# Patient Record
Sex: MALE | Race: WHITE | Employment: FULL TIME | URBAN - METROPOLITAN AREA
[De-identification: names, ages, dates, MRNs, and addresses within clinical notes are randomized per-mention and may not be internally consistent; named-entity substitution may affect disease eponyms.]

---

## 2017-03-10 ENCOUNTER — EMPLOYEE WELLNESS (OUTPATIENT)
Dept: OTHER | Age: 37
End: 2017-03-10

## 2017-05-31 ENCOUNTER — OFFICE VISIT (OUTPATIENT)
Dept: FAMILY MEDICINE CLINIC | Age: 37
End: 2017-05-31

## 2017-05-31 VITALS
HEART RATE: 80 BPM | SYSTOLIC BLOOD PRESSURE: 110 MMHG | WEIGHT: 193.8 LBS | HEIGHT: 70 IN | OXYGEN SATURATION: 98 % | DIASTOLIC BLOOD PRESSURE: 60 MMHG | BODY MASS INDEX: 27.75 KG/M2

## 2017-05-31 DIAGNOSIS — K42.9 UMBILICAL HERNIA WITHOUT OBSTRUCTION AND WITHOUT GANGRENE: Primary | ICD-10-CM

## 2017-05-31 PROCEDURE — 99213 OFFICE O/P EST LOW 20 MIN: CPT | Performed by: FAMILY MEDICINE

## 2017-05-31 ASSESSMENT — ENCOUNTER SYMPTOMS
CONSTIPATION: 0
DIARRHEA: 0
ABDOMINAL PAIN: 1
ANAL BLEEDING: 0
NAUSEA: 0
VOMITING: 0

## 2017-10-30 ENCOUNTER — OFFICE VISIT (OUTPATIENT)
Dept: SURGERY | Age: 37
End: 2017-10-30

## 2017-10-30 ENCOUNTER — SURG/PROC ORDERS (OUTPATIENT)
Dept: SURGERY | Age: 37
End: 2017-10-30

## 2017-10-30 VITALS
DIASTOLIC BLOOD PRESSURE: 70 MMHG | BODY MASS INDEX: 30.45 KG/M2 | WEIGHT: 194 LBS | HEIGHT: 67 IN | SYSTOLIC BLOOD PRESSURE: 124 MMHG

## 2017-10-30 DIAGNOSIS — K42.9 UMBILICAL HERNIA WITHOUT OBSTRUCTION AND WITHOUT GANGRENE: Primary | ICD-10-CM

## 2017-10-30 PROCEDURE — 99242 OFF/OP CONSLTJ NEW/EST SF 20: CPT | Performed by: SURGERY

## 2017-10-30 RX ORDER — SODIUM CHLORIDE 0.9 % (FLUSH) 0.9 %
10 SYRINGE (ML) INJECTION PRN
Status: CANCELLED | OUTPATIENT
Start: 2017-10-30

## 2017-10-30 RX ORDER — SODIUM CHLORIDE 0.9 % (FLUSH) 0.9 %
10 SYRINGE (ML) INJECTION EVERY 12 HOURS SCHEDULED
Status: CANCELLED | OUTPATIENT
Start: 2017-10-30

## 2017-11-03 ENCOUNTER — OFFICE VISIT (OUTPATIENT)
Dept: FAMILY MEDICINE CLINIC | Age: 37
End: 2017-11-03

## 2017-11-03 VITALS
HEART RATE: 92 BPM | TEMPERATURE: 98 F | HEIGHT: 70 IN | WEIGHT: 186 LBS | DIASTOLIC BLOOD PRESSURE: 70 MMHG | SYSTOLIC BLOOD PRESSURE: 114 MMHG | RESPIRATION RATE: 18 BRPM | BODY MASS INDEX: 26.63 KG/M2

## 2017-11-03 DIAGNOSIS — Z00.00 PE (PHYSICAL EXAM), ANNUAL: Primary | ICD-10-CM

## 2017-11-03 DIAGNOSIS — D22.9 ATYPICAL NEVI: ICD-10-CM

## 2017-11-03 DIAGNOSIS — K42.9 UMBILICAL HERNIA WITHOUT OBSTRUCTION AND WITHOUT GANGRENE: ICD-10-CM

## 2017-11-03 PROCEDURE — 99395 PREV VISIT EST AGE 18-39: CPT | Performed by: INTERNAL MEDICINE

## 2017-11-03 ASSESSMENT — ENCOUNTER SYMPTOMS
VOMITING: 0
EYE PAIN: 0
CONSTIPATION: 0
WHEEZING: 0
SHORTNESS OF BREATH: 0
COLOR CHANGE: 0
NAUSEA: 0
DIARRHEA: 0

## 2017-11-03 ASSESSMENT — PATIENT HEALTH QUESTIONNAIRE - PHQ9
SUM OF ALL RESPONSES TO PHQ9 QUESTIONS 1 & 2: 0
2. FEELING DOWN, DEPRESSED OR HOPELESS: 0
SUM OF ALL RESPONSES TO PHQ QUESTIONS 1-9: 0
1. LITTLE INTEREST OR PLEASURE IN DOING THINGS: 0

## 2017-11-03 NOTE — PROGRESS NOTES
11/3/2017    This is a 30 y.o. male   Chief Complaint   Patient presents with   [facility] Emp estLiam care   . establish  New pcp  Works here  In microbiology  Has umbilical hernia and getting surgery next Friday  No heart or lung problems  Was not told he needed a preop  Last yr had two cortisone shots in the lumbar area  Back pain is much better    2 kids   No smoking  Occasional beer  No sleep apnea      HPI    Review of Systems   Constitutional: Negative for appetite change, fatigue and unexpected weight change. HENT: Negative for hearing loss and tinnitus. Eyes: Negative for pain and visual disturbance. Respiratory: Negative for shortness of breath and wheezing. Cardiovascular: Negative for chest pain, palpitations and leg swelling. Gastrointestinal: Negative for constipation, diarrhea, nausea and vomiting. Endocrine: Negative for cold intolerance and heat intolerance. Genitourinary: Negative for dysuria and frequency. Musculoskeletal: Negative for gait problem and joint swelling. Skin: Negative for color change and rash. Neurological: Negative for dizziness and headaches. Psychiatric/Behavioral: Negative for dysphoric mood. The patient is not nervous/anxious. No past medical history on file.     Prior to Visit Medications    Not on File       Past Surgical History:   Procedure Laterality Date    MENISCECTOMY      right knee       Social History     Social History    Marital status:      Spouse name: Jessee Ocampo Number of children: 2    Years of education: N/A     Occupational History    microbiology tech      Social History Main Topics    Smoking status: Never Smoker    Smokeless tobacco: Never Used    Alcohol use 1.2 oz/week     2 Cans of beer per week    Drug use: No    Sexual activity: Not on file     Other Topics Concern    Not on file     Social History Narrative    Exercises regularly, eats healthy diet       Family History   Problem Relation annual    Atypical nevi    Umbilical hernia without obstruction and without gangrene      Luzma Zamora was here as a new patient to get established. He is doing very well. He is having umbilical hernia surgery next week. He did not indicate he needed a preop but I told him this could count as a preop exam . He is stable for surgery. He does have a lot of atypical nevi and I am encouraging him to go see a dermatologist and gave him a referral to the [facility] dermatology group. If he has difficulty getting in with that group in the next few months. Then  he needs to call me back. He does not need lab work or immunizations today.   I reviewed his cholesterol and glucose    fu one year

## 2017-11-08 NOTE — PRE-PROCEDURE INSTRUCTIONS
PRE OP INSTRUCTION SHEET   1. Do not eat or drink anything after 12 midnight  prior to surgery. This includes no water, chewing gum or mints. 2. Take the following pills will a small sip of water (see MAR)                                        3. Aspirin, Ibuprofen, Advil, Naproxen, Vitamin E, fish oil and other Anti-inflammatory products should be stopped for 5 days before surgery or as directed by your physician. 4. Check with your Doctor regarding stopping Plavix, Coumadin, Lovenox, Fragmin or other blood thinners   5. Do not smoke, and do not drink any alcoholic beverages 24 hours prior to surgery. This includes NA Beer. 6. You may brush your teeth and gargle the morning of surgery. DO NOT SWALLOW WATER   7. You MUST make arrangements for a responsible adult to take you home after your surgery. You will not be allowed to leave alone or drive yourself home. It is strongly suggested someone stay with you the first 24 hrs. Your surgery will be cancelled if you do not have a ride home. 8. A parent/legal guardian must accompany a child scheduled for surgery and plan to stay at the hospital until the child is discharged. Please do not bring other children with you. 9. Please wear simple, loose fitting clothing to the hospital.  [facility] Staff not bring valuables (money, credit cards, checkbooks, etc.) Do not wear any makeup (including no eye makeup) or nail polish on your fingers or toes. 10. DO NOT wear any jewelry or piercings on day of surgery. All body piercing jewelry must be removed. 11. If you have dentures,glasses, or contacts they will be removed before going to the OR; we will provide you a container. 12. Please see your family doctor/and cardiologist for a history & physical and/or concerning medications. Bring any test results/reports from your physician's office. Have history and labs faxed to [phone].  Remember to bring Blood Bank bracelet on the day of surgery. 14. If you have a Living Will and Durable Power of  for Healthcare, please bring in a copy. 13. Notify your Surgeon if you develop any illness between now and surgery  time, cough, cold, fever, sore throat, nausea, vomiting, etc.  Please notify your surgeon if you experience dizziness, shortness of breath or blurred vision between now & the time of your surgery   16. DO NOT shave your operative site 96 hours prior to surgery. For face & neck surgery, men may use an electric razor 48 hours prior to surgery. 17. Shower with _x__Antibacterial soap (x_chlorhexidine for total joint  Pt's) shower two times before surgery.(the morning of and the night before. 18. To provide excellent care visitors will be limited to one in the room at any given time.   Please call pre admission testing if you any further questions [phone] or [phone]

## 2017-11-10 ENCOUNTER — HOSPITAL ENCOUNTER (OUTPATIENT)
Dept: SURGERY | Age: 37
Discharge: HOME | End: 2017-11-10
Attending: SURGERY | Admitting: SURGERY

## 2017-11-10 VITALS
TEMPERATURE: 97.8 F | HEIGHT: 71 IN | SYSTOLIC BLOOD PRESSURE: 118 MMHG | WEIGHT: 195 LBS | OXYGEN SATURATION: 100 % | DIASTOLIC BLOOD PRESSURE: 67 MMHG | HEART RATE: 61 BPM | BODY MASS INDEX: 27.3 KG/M2 | RESPIRATION RATE: 16 BRPM

## 2017-11-10 PROCEDURE — 49585 REPAIR UMBILICAL HERN,5+Y/O,REDUC: CPT | Performed by: SURGERY

## 2017-11-10 RX ORDER — SODIUM CHLORIDE 0.9 % (FLUSH) 0.9 %
10 SYRINGE (ML) INJECTION PRN
Status: DISCONTINUED | OUTPATIENT
Start: 2017-11-10 | End: 2017-11-10 | Stop reason: SDUPTHER

## 2017-11-10 RX ORDER — SODIUM CHLORIDE 0.9 % (FLUSH) 0.9 %
10 SYRINGE (ML) INJECTION EVERY 12 HOURS SCHEDULED
Status: DISCONTINUED | OUTPATIENT
Start: 2017-11-10 | End: 2017-11-10 | Stop reason: SDUPTHER

## 2017-11-10 RX ORDER — SODIUM CHLORIDE 0.9 % (FLUSH) 0.9 %
10 SYRINGE (ML) INJECTION EVERY 12 HOURS SCHEDULED
Status: DISCONTINUED | OUTPATIENT
Start: 2017-11-10 | End: 2017-11-11 | Stop reason: HOSPADM

## 2017-11-10 RX ORDER — SODIUM CHLORIDE, SODIUM LACTATE, POTASSIUM CHLORIDE, CALCIUM CHLORIDE 600; 310; 30; 20 MG/100ML; MG/100ML; MG/100ML; MG/100ML
INJECTION, SOLUTION INTRAVENOUS CONTINUOUS
Status: DISCONTINUED | OUTPATIENT
Start: 2017-11-10 | End: 2017-11-11 | Stop reason: HOSPADM

## 2017-11-10 RX ORDER — MEPERIDINE HYDROCHLORIDE 25 MG/ML
12.5 INJECTION INTRAMUSCULAR; INTRAVENOUS; SUBCUTANEOUS EVERY 5 MIN PRN
Status: DISCONTINUED | OUTPATIENT
Start: 2017-11-10 | End: 2017-11-11 | Stop reason: HOSPADM

## 2017-11-10 RX ORDER — OXYCODONE HYDROCHLORIDE 5 MG/1
5-10 TABLET ORAL EVERY 4 HOURS PRN
Qty: 28 TABLET | Refills: 0 | Status: SHIPPED | OUTPATIENT
Start: 2017-11-10 | End: 2017-11-17

## 2017-11-10 RX ORDER — LABETALOL HYDROCHLORIDE 5 MG/ML
5 INJECTION, SOLUTION INTRAVENOUS EVERY 10 MIN PRN
Status: DISCONTINUED | OUTPATIENT
Start: 2017-11-10 | End: 2017-11-11 | Stop reason: HOSPADM

## 2017-11-10 RX ORDER — OXYCODONE HYDROCHLORIDE AND ACETAMINOPHEN 5; 325 MG/1; MG/1
1 TABLET ORAL PRN
Status: COMPLETED | OUTPATIENT
Start: 2017-11-10 | End: 2017-11-10

## 2017-11-10 RX ORDER — SODIUM CHLORIDE 0.9 % (FLUSH) 0.9 %
10 SYRINGE (ML) INJECTION PRN
Status: DISCONTINUED | OUTPATIENT
Start: 2017-11-10 | End: 2017-11-11 | Stop reason: HOSPADM

## 2017-11-10 RX ORDER — HYDRALAZINE HYDROCHLORIDE 20 MG/ML
5 INJECTION INTRAMUSCULAR; INTRAVENOUS EVERY 10 MIN PRN
Status: DISCONTINUED | OUTPATIENT
Start: 2017-11-10 | End: 2017-11-11 | Stop reason: HOSPADM

## 2017-11-10 RX ORDER — LIDOCAINE HYDROCHLORIDE 10 MG/ML
0.3 INJECTION, SOLUTION EPIDURAL; INFILTRATION; INTRACAUDAL; PERINEURAL
Status: ACTIVE | OUTPATIENT
Start: 2017-11-10 | End: 2017-11-10

## 2017-11-10 RX ORDER — ONDANSETRON 2 MG/ML
4 INJECTION INTRAMUSCULAR; INTRAVENOUS EVERY 10 MIN PRN
Status: DISCONTINUED | OUTPATIENT
Start: 2017-11-10 | End: 2017-11-11 | Stop reason: HOSPADM

## 2017-11-10 RX ORDER — OXYCODONE HYDROCHLORIDE AND ACETAMINOPHEN 5; 325 MG/1; MG/1
2 TABLET ORAL PRN
Status: COMPLETED | OUTPATIENT
Start: 2017-11-10 | End: 2017-11-10

## 2017-11-10 RX ADMIN — OXYCODONE HYDROCHLORIDE AND ACETAMINOPHEN 1 TABLET: 5; 325 TABLET ORAL at 10:55

## 2017-11-10 RX ADMIN — Medication 0.5 MG: at 10:31

## 2017-11-10 ASSESSMENT — PAIN SCALES - GENERAL
PAINLEVEL_OUTOF10: 0
PAINLEVEL_OUTOF10: 0
PAINLEVEL_OUTOF10: 2
PAINLEVEL_OUTOF10: 7
PAINLEVEL_OUTOF10: 2
PAINLEVEL_OUTOF10: 2
PAINLEVEL_OUTOF10: 0
PAINLEVEL_OUTOF10: 2
PAINLEVEL_OUTOF10: 2
PAINLEVEL_OUTOF10: 5

## 2017-11-10 ASSESSMENT — PAIN DESCRIPTION - PAIN TYPE: TYPE: SURGICAL PAIN

## 2017-11-10 ASSESSMENT — PAIN DESCRIPTION - LOCATION: LOCATION: ABDOMEN

## 2017-11-10 ASSESSMENT — PAIN DESCRIPTION - DESCRIPTORS: DESCRIPTORS: ACHING

## 2017-11-10 ASSESSMENT — PAIN - FUNCTIONAL ASSESSMENT: PAIN_FUNCTIONAL_ASSESSMENT: 0-10

## 2017-11-10 NOTE — PROGRESS NOTES
.Attempted to contact patient. No answer. Unable to leave message related to mailbox full, No message left at 2:53 PM on 11/7/2017.
ASSESSMENT UNCHANGED. FAMILY VERBALIZED UNDERSTANDING OF DISCHARGE INSTRUCTION. DC'D VIA WC IN STABLE CONDITION.
Attempted to contact patient. No answer. Message left at 9:17 AM on 11/8/2017.
and Phase II process. 23.  Patient pain level is established preoperatively using age appropriate pain scale. 24.  The patient will move to fall risk upon sedation- during and through the recovery phase. Interventions- orient the patient to the environment, especially the location of the bathroom; provide treaded socks/non-skid footwear; demonstrate and teach back use of the nurse's call system; instruct the patient to call for help before getting out of bed; lock all movable equipment before transferring patient; keep bed in lowest position possible.  25.  Other:
surgery. 14. If you have a Living Will and Durable Power of  for Healthcare, please bring in a copy. 13. Notify your Surgeon if you develop any illness between now and surgery  time, cough, cold, fever, sore throat, nausea, vomiting, etc.  Please notify your surgeon if you experience dizziness, shortness of breath or blurred vision between now & the time of your surgery   16. DO NOT shave your operative site 96 hours prior to surgery. For face & neck surgery, men may use an electric razor 48 hours prior to surgery. 17. Shower with _x__Antibacterial soap (x_chlorhexidine for total joint  Pt's) shower two times before surgery.(the morning of and the night before. 18. To provide excellent care visitors will be limited to one in the room at any given time.   Please call pre admission testing if you any further questions [phone] or [phone]

## 2017-11-10 NOTE — H&P
I have reviewed the progress note serving as history and physical dated October/30/ 2017 and examined the patient and find no relevant changes. I have reviewed with the patient and/or family the risks, benefits, and alternatives to the procedure.

## 2017-11-10 NOTE — PLAN OF CARE
and Phase II process. 23.  Patient pain level is established preoperatively using age appropriate pain scale. 24.  The patient will move to fall risk upon sedation- during and through the recovery phase. Interventions- orient the patient to the environment, especially the location of the bathroom; provide treaded socks/non-skid footwear; demonstrate and teach back use of the nurse's call system; instruct the patient to call for help before getting out of bed; lock all movable equipment before transferring patient; keep bed in lowest position possible.  25.  Other:

## 2017-11-10 NOTE — ANESTHESIA POST-OP
Postoperative Anesthesia Note    Name:    Sawyer Wu  MRN:      [id number]    Patient Vitals for the past 12 hrs:   BP Temp Temp src Pulse Resp SpO2 Height Weight   11/10/17 1147 118/67 - - 61 16 - - -   11/10/17 1120 (!) 114/54 97.8 °F (36.6 °C) Temporal 64 17 100 % - -   11/10/17 1115 111/72 - - 60 13 100 % - -   11/10/17 1100 114/66 97.8 °F (36.6 °C) Temporal 73 16 100 % - -   11/10/17 1045 107/66 - - 69 14 100 % - -   11/10/17 1028 111/69 - - 77 16 100 % - -   11/10/17 1023 109/68 - - 77 12 99 % - -   11/10/17 1018 115/68 97.5 °F (36.4 °C) Temporal 80 14 100 % - -   11/10/17 0920 - - - - - - 5' 11\" (1.803 m) 195 lb (88.5 kg)   11/10/17 0912 118/75 98.6 °F (37 °C) Temporal 74 16 98 % - -        LABS:    CBC  Lab Results   Component Value Date/Time    WBC 7.4 04/12/2011 09:26 AM    HGB 15.1 04/12/2011 09:26 AM    HCT 45.0 04/12/2011 09:26 AM     04/12/2011 09:26 AM     RENAL  Lab Results   Component Value Date/Time     04/12/2011 09:26 AM    K 4.1 04/12/2011 09:26 AM     04/12/2011 09:26 AM    CO2 32 04/12/2011 09:26 AM    BUN 16 04/12/2011 09:26 AM    CREATININE 1.0 04/12/2011 09:26 AM    GLUCOSE 89 03/10/2017 09:41 AM     COAGS  No results found for: PROTIME, INR, APTT    Intake & Output: In: 8188 [P.O.:240; I.V.:850]  Out: -     Nausea & Vomiting:  No    Level of Consciousness:  Awake    Pain Assessment:  Adequate analgesia    Anesthesia Complications:  No apparent anesthetic complications    SUMMARY      Vital signs stable  OK to discharge from Stage I post anesthesia care.   Care transferred from Anesthesiology department on discharge from perioperative area

## 2017-11-11 NOTE — OP NOTE
[address]                  [address]                                 OPERATIVE REPORT    PATIENT NAME: Lori Grey                    :        1980  MED REC NO:   [id number]                          ROOM:  ACCOUNT NO:   [de-identified]                          ADMIT DATE: 11/10/2017  PROVIDER:     Lucy Villa MD    DATE OF PROCEDURE:  11/10/2017    PREOPERATIVE DIAGNOSIS:  Umbilical hernia. POSTOPERATIVE DIAGNOSIS:  Umbilical hernia. PROCEDURE:  Umbilical hernia repair with mesh. ANESTHESIA:  Local with MAC. SURGEON:  Lucy Villa MD    INDICATIONS: The patient is a 29-year-old gentleman, who presented with  abdominal pain and was found to have an umbilical hernia. OPERATIVE SUMMARY:  After preoperative evaluation, the patient was brought  into the operating suite and placed in a comfortable supine position on the  operating room table. Monitoring equipment was attached and he was given  intravenous sedation per anesthesia. His abdomen was sterilely prepped and  draped, and the periumbilical area was anesthetized with local anesthetic. A semicircular infraumbilical incision was made. This was dissected down  to the fascia. The umbilicus was dissected off the underlying hernia  defect and the hernia was reduced. The preperitoneal space was entered and  enlarged, and a small Ventralight ST mesh is obtained and placed in the  preperitoneal space. It was pulled up snugly against the anterior  abdominal wall. The fascia was then closed primarily, incorporating tail  of the mesh in the closure. The tail of the mesh was then cut off at the  fascial level and the base of the umbilicus was tacked back down to the  fascia with a figure-of-eight suture of 0 Vicryl. Subcutaneous tissues  were approximated with interrupted sutures of 3-0 Vicryl and the skin was  closed with a running subcuticular suture of 4-0 Vicryl. Benzoin,  Steri-Strips and dry sterile dressings were applied. All sponge, needle  and instrument counts were correct at the end of the case. The patient  tolerated the procedure well and was taken to recovery area in stable  condition. Cristina Bledsoe MD    D: 11/10/2017 10:30:41       T: 11/10/2017 10:32:38     [email or web address]  Job#: [id number]     Doc#: [id number]    CC:  Lorelei Heller.  Shanique Neal MD

## 2017-11-27 ENCOUNTER — OFFICE VISIT (OUTPATIENT)
Dept: SURGERY | Age: 37
End: 2017-11-27

## 2017-11-27 VITALS
BODY MASS INDEX: 25.48 KG/M2 | HEIGHT: 71 IN | DIASTOLIC BLOOD PRESSURE: 74 MMHG | SYSTOLIC BLOOD PRESSURE: 120 MMHG | WEIGHT: 182 LBS

## 2017-11-27 DIAGNOSIS — Z98.890 POST-OPERATIVE STATE: Primary | ICD-10-CM

## 2017-11-27 PROCEDURE — 99024 POSTOP FOLLOW-UP VISIT: CPT | Performed by: SURGERY

## 2017-11-27 NOTE — PROGRESS NOTES
[facility] GENERAL SURGERY      S:   Patient presents s/p umbilical hernia repair with mesh. He reports doing well. O:   Comfortable         Incision site healing well. A:   S/P umbilical hernia repair with mesh    P:   Follow up as needed.

## 2018-03-12 ENCOUNTER — EMPLOYEE WELLNESS (OUTPATIENT)
Dept: OTHER | Age: 38
End: 2018-03-12

## 2018-03-12 LAB
CHOLESTEROL, TOTAL: 187 MG/DL (ref 0–199)
GLUCOSE BLD-MCNC: 96 MG/DL (ref 70–99)
HDLC SERPL-MCNC: 38 MG/DL (ref 40–60)
LDL CHOLESTEROL CALCULATED: 126 MG/DL
TRIGL SERPL-MCNC: 116 MG/DL (ref 0–150)

## 2018-03-20 VITALS — WEIGHT: 197 LBS | BODY MASS INDEX: 27.58 KG/M2

## 2018-04-02 VITALS — BODY MASS INDEX: 27.07 KG/M2 | WEIGHT: 194 LBS

## 2018-04-12 PROBLEM — Z00.00 PE (PHYSICAL EXAM), ANNUAL: Status: RESOLVED | Noted: 2017-11-03 | Resolved: 2018-04-12

## 2018-04-24 ENCOUNTER — OFFICE VISIT (OUTPATIENT)
Dept: DERMATOLOGY | Age: 38
End: 2018-04-24

## 2018-04-24 DIAGNOSIS — D48.9 NEOPLASM OF UNCERTAIN BEHAVIOR: Primary | ICD-10-CM

## 2018-04-24 DIAGNOSIS — L91.0 HYPERTROPHIC SCAR: ICD-10-CM

## 2018-04-24 DIAGNOSIS — D22.9 MULTIPLE BENIGN MELANOCYTIC NEVI: ICD-10-CM

## 2018-04-24 DIAGNOSIS — Z78.9 NON-TOBACCO USER: ICD-10-CM

## 2018-04-24 DIAGNOSIS — L91.8 SKIN TAGS, MULTIPLE ACQUIRED: ICD-10-CM

## 2018-04-24 DIAGNOSIS — L81.2 EPHELIDES: ICD-10-CM

## 2018-04-24 PROCEDURE — 99243 OFF/OP CNSLTJ NEW/EST LOW 30: CPT | Performed by: DERMATOLOGY

## 2018-04-24 PROCEDURE — 11100 PR BIOPSY OF SKIN LESION: CPT | Performed by: DERMATOLOGY

## 2018-04-27 ENCOUNTER — TELEPHONE (OUTPATIENT)
Dept: DERMATOLOGY | Age: 38
End: 2018-04-27

## 2018-07-29 ENCOUNTER — TELEPHONE (OUTPATIENT)
Dept: OTHER | Facility: CLINIC | Age: 38
End: 2018-07-29

## 2018-09-17 ENCOUNTER — PATIENT MESSAGE (OUTPATIENT)
Dept: FAMILY MEDICINE CLINIC | Age: 38
End: 2018-09-17

## 2018-09-17 ENCOUNTER — TELEPHONE (OUTPATIENT)
Dept: FAMILY MEDICINE CLINIC | Age: 38
End: 2018-09-17

## 2018-09-17 ENCOUNTER — OFFICE VISIT (OUTPATIENT)
Dept: FAMILY MEDICINE CLINIC | Age: 38
End: 2018-09-17

## 2018-09-17 VITALS
TEMPERATURE: 98.7 F | RESPIRATION RATE: 18 BRPM | DIASTOLIC BLOOD PRESSURE: 80 MMHG | WEIGHT: 196 LBS | SYSTOLIC BLOOD PRESSURE: 120 MMHG | BODY MASS INDEX: 27.35 KG/M2 | OXYGEN SATURATION: 98 % | HEART RATE: 98 BPM

## 2018-09-17 DIAGNOSIS — J06.9 VIRAL URI WITH COUGH: Primary | ICD-10-CM

## 2018-09-17 PROCEDURE — 99213 OFFICE O/P EST LOW 20 MIN: CPT | Performed by: NURSE PRACTITIONER

## 2018-09-17 RX ORDER — DEXTROMETHORPHAN HYDROBROMIDE AND PROMETHAZINE HYDROCHLORIDE 15; 6.25 MG/5ML; MG/5ML
5 SYRUP ORAL 4 TIMES DAILY PRN
Qty: 140 ML | Refills: 0 | Status: SHIPPED | OUTPATIENT
Start: 2018-09-17 | End: 2018-09-24

## 2018-09-17 RX ORDER — FLUTICASONE PROPIONATE 50 MCG
2 SPRAY, SUSPENSION (ML) NASAL DAILY
Qty: 1 BOTTLE | Refills: 0 | Status: SHIPPED | OUTPATIENT
Start: 2018-09-17 | End: 2020-11-11

## 2018-09-17 ASSESSMENT — PATIENT HEALTH QUESTIONNAIRE - PHQ9
SUM OF ALL RESPONSES TO PHQ QUESTIONS 1-9: 0
SUM OF ALL RESPONSES TO PHQ9 QUESTIONS 1 & 2: 0
SUM OF ALL RESPONSES TO PHQ QUESTIONS 1-9: 0
1. LITTLE INTEREST OR PLEASURE IN DOING THINGS: 0
2. FEELING DOWN, DEPRESSED OR HOPELESS: 0

## 2018-09-17 ASSESSMENT — ENCOUNTER SYMPTOMS
SINUS PRESSURE: 0
NAUSEA: 0
SHORTNESS OF BREATH: 0
WHEEZING: 0
CHEST TIGHTNESS: 0
COUGH: 1
SINUS PAIN: 0
VOMITING: 1
RHINORRHEA: 1
SORE THROAT: 1
DIARRHEA: 0

## 2018-09-17 NOTE — PROGRESS NOTES
Normal saline spray PRN  Mucinex BID PRN  Patient is to call if symptoms worsen or fail to improve. Return if symptoms worsen or fail to improve.

## 2018-09-17 NOTE — TELEPHONE ENCOUNTER
From: Isiah Payne  To: Natalia Humphrey MD  Sent: 9/17/2018 9:28 AM EDT  Subject: Non-Urgent Medical Question    Do you accept [employer] insurance?

## 2018-09-17 NOTE — LETTER
[address] [address]  Phone: [phone]  Fax: [address] [facility], APRN - MAGALI        September 17, 2018     Patient: Nidia Osuna   YOB: 1980   Date of Visit: 9/17/2018       To Whom it May Concern:    Nidia Osuna was seen in my clinic on 9/17/2018. If you have any questions or concerns, please don't hesitate to call.     Sincerely,     TIFF Watters - CNP

## 2020-11-11 ENCOUNTER — OFFICE VISIT (OUTPATIENT)
Dept: ORTHOPEDIC SURGERY | Age: 40
End: 2020-11-11
Payer: COMMERCIAL

## 2020-11-11 VITALS — WEIGHT: 196 LBS | BODY MASS INDEX: 27.44 KG/M2 | HEIGHT: 71 IN

## 2020-11-11 PROCEDURE — 99203 OFFICE O/P NEW LOW 30 MIN: CPT | Performed by: PHYSICAL MEDICINE & REHABILITATION

## 2020-11-11 PROCEDURE — 96372 THER/PROPH/DIAG INJ SC/IM: CPT | Performed by: PHYSICAL MEDICINE & REHABILITATION

## 2020-11-11 RX ORDER — KETOROLAC TROMETHAMINE 30 MG/ML
60 INJECTION, SOLUTION INTRAMUSCULAR; INTRAVENOUS ONCE
Status: COMPLETED | OUTPATIENT
Start: 2020-11-11 | End: 2020-11-11

## 2020-11-11 RX ORDER — PREDNISONE 10 MG/1
TABLET ORAL
Qty: 26 TABLET | Refills: 0 | Status: SHIPPED | OUTPATIENT
Start: 2020-11-11

## 2020-11-11 RX ADMIN — KETOROLAC TROMETHAMINE 60 MG: 30 INJECTION, SOLUTION INTRAMUSCULAR; INTRAVENOUS at 09:41

## 2020-11-11 NOTE — PROGRESS NOTES
New Patient: SPINE    CHIEF COMPLAINT:    Chief Complaint   Patient presents with    Back Pain     OP/NP LBP       HISTORY OF PRESENT ILLNESS:                The patient is a 47 y.o. male former patient of mine with discogenic back pain treated successfully epidurals 2016. He has known L4-5 DDD    He did well for 4 years and had aggravation of the same back pain after doing a lot of yard work on Sunday. Midline axial severe pain that he rates 10 out of 10. No rating pain in his legs. He is immobile and is having difficulty with standing and walking. Is worse with any activity including sitting standing walking. He has pain midline in his lumbar spine he rates it 10/10 he has no fevers chills or night sweats    He is employed as a /teacher. He is formally a  at [employer] ([employer])    Past treatment includes epidural injections physical therapy and chiropractics  No past medical history on file. Pain Assessment  Location of Pain: Back  Severity of Pain: 10  Quality of Pain: Aching  Duration of Pain: Persistent  Frequency of Pain: Constant  Aggravating Factors: Standing, Walking, Other (Comment), Stairs, Bending, Stretching, Straightening  Limiting Behavior: Yes  Relieving Factors: Rest  Result of Injury: No  Work-Related Injury: No  Are there other pain locations you wish to document?: No    The pain assessment was noted & reviewed in the medical record today. Current/Past Treatment:   · Physical Therapy:   · Chiropractic:     · Injection:   2016 LESI x2 with relief  Medications:            NSAIDS:             Muscle relaxer:              Steriods:              Neuropathic medications:              Opioids:            Other:   · Surgery/Consult:    Work Status/Functionality: , instructor    Past Medical History: Medical history form was reviewed today & scanned into the media tab  No past medical history on file.    Past Surgical History:     Past Surgical History: tenderness in upper or lower extremities. Good capillary refill  · Lymphatic: The lymphatic examination bilaterally reveals all areas to be without enlargement or induration  · Sensation: Sensation is intact without deficit  · Coordination/Balance: Good coordination       LUMBAR/SACRAL EXAMINATION:  · Inspection: Local inspection shows no step-off or bruising. Lumbar alignment is normal.  Sagittal and Coronal balance is neutral.      · Palpation:   No evidence of tenderness at the midline. No tenderness bilaterally at the paraspinal or trochanters. There is no step-off or paraspinal spasm. · Range of Motion: Difficult to assess as he is quite uncomfortable with any mobility  · Strength:   Strength testing is 5/5 in all muscle groups tested. · Special Tests:   Straight leg raise and crossed SLR negative. Leg length and pelvis level.  0 out of 5 Millie's signs. · Skin: There are no rashes, ulcerations or lesions. · Reflexes: Reflexes are symmetrically face at the patellar and ankle tendons. Clonus absent bilaterally at the feet. · Gait & station: Normal gait but walks slowly with antalgic  · Additional Examinations:   · RIGHT LOWER EXTREMITY: Inspection/examination of the right lower extremity does not show any tenderness, deformity or injury. Range of motion is full. There is no gross instability. There are no rashes, ulcerations or lesions. Strength and tone are normal.  ·   · LEFT LOWER EXTREMITY:  Inspection/examination of the left lower extremity does not show any tenderness, deformity or injury. Range of motion is full. There is no gross instability. There are no rashes, ulcerations or lesions.   Strength and tone are normal.    Diagnostic Testin views lumbar spine 2020 show mild disc space narrowing L4-5, no change from prior x-rays, no acute fracture    MRI report 2016 shows central L4-5 HNP with DDD L4-5    Impression:      Acute discogenic aggravation      Plan: Toradol 60 mg IM (NDC# 17492-888-31) given into the Left gluteus alva, patient tolerated procedure well.     pred taper    Midline L5-S1 interlaminar epidural, #1 new series. Cancel if improved    We discussed repeating his lumbar MRI prior. However he and I both feel comfortable proceeding with treatment without as this is the same pain is previous.   Further imaging if not improved

## 2020-11-20 ENCOUNTER — TELEPHONE (OUTPATIENT)
Dept: ORTHOPEDIC SURGERY | Age: 40
End: 2020-11-20

## 2020-11-25 ENCOUNTER — TELEPHONE (OUTPATIENT)
Dept: ORTHOPEDIC SURGERY | Age: 40
End: 2020-11-25

## 2020-11-25 NOTE — TELEPHONE ENCOUNTER
DENIED  Date: 12/07/2020  Type of SX: OP  Location: 12/07/2020  CPT: 92735  SX area: Midline L5 - S1 Interlaminar  RANDI #1  REASON: You need special pictures of you back within the last 12 months  Peer to peer: [phone]  Reference # [id number]  NPR

## 2020-11-25 NOTE — TELEPHONE ENCOUNTER
SPOKE WITH PATIENT AND LET HIM KNOW THAT HIS RANDI HAS BEEN DENIED BY  INSURANCE D/T NOT HAVING A RECENT MRI/CT SCAN WITHIN THE PAST 12 MONTHS. VOICED UNDERSTANDING. IN FACT HE STATES THAT HIS PAIN HAS IMPROVED WITH THE TORADOL INJECTION HE RECEIVED IN OFFICE.   AT THIS TIME HE WILL HOLD OFF ON GETTING THE INJECTION SINCE HE IS IMPROVING AND WILL CALL THE OFFICE IF THE PAIN RETURNS AND WE WILL REQUEST A NEW MRI

## 2021-08-10 ENCOUNTER — HOSPITAL ENCOUNTER (OUTPATIENT)
Dept: PHYSICAL THERAPY | Age: 41
Setting detail: THERAPIES SERIES
Discharge: HOME | End: 2021-08-10
Payer: COMMERCIAL

## 2021-08-10 PROCEDURE — 97140 MANUAL THERAPY 1/> REGIONS: CPT

## 2021-08-10 PROCEDURE — 97161 PT EVAL LOW COMPLEX 20 MIN: CPT

## 2021-08-10 NOTE — PLAN OF CARE
[facility], [address] [place] [address]  Phone: [phone]  Fax [phone]    Physical Therapy Certification    Dear Referring Practitioner: Nahed Welch,    We had the pleasure of evaluating the following patient for physical therapy services at [address] [place] [address]. A summary of our findings can be found in the initial assessment below. This includes our plan of care. If you have any questions or concerns regarding these findings, please do not hesitate to contact me at the office phone number checked above.   Thank you for the referral.       Physician Signature:_______________________________Date:__________________  By signing above (or electronic signature), therapists plan is approved by physician    Patient: Jose Antonio Montiel   : 1980   MRN: [id number]  Referring Physician: Referring Practitioner: Nahed Welch      Evaluation Date: 8/10/2021      Medical Diagnosis Information:  Diagnosis: M54.40 lumbago with sciatica, M51.38 other thoracic, thoracolumbar and lumbosacral intervertebral disc degeneration   Treatment Diagnosis: M54.40 lumbago with sciatica, M51.38 other thoracic, thoracolumbar and lumbosacral intervertebral disc degeneration, M53.2X8 sacroiliac pain                                         Insurance information: PT Insurance Information: BCBS       Precautions/ Contra-indications: none    C-SSRS Triggered by Intake questionnaire (Past 2 wk assessment):   [x] No, Questionnaire did not trigger screening.   [] Yes, Patient intake triggered further evaluation      [] C-SSRS Screening completed  [] PCP notified via Plan of Care  [] Emergency services notified     Latex Allergy:  [x]NO      []YES  Preferred Language for Healthcare:   [x]English       []other:    SUBJECTIVE: Patient stated complaint:Pt c/o worsening LBP ~1 month after lots of rep bending/lifting for yardwork (lifting/laying pavers, gravel), and he had inc'd trouble with getting in/out of bed, sit<>stand from car/toilet and he could not flex/ext without inc'd LBP. He denies radiating pain, stays more central low back and slightly to R buttock. He denies bowel/bladder changes, loss of strength. He's had hx lumbar DDD and ESIs x2. Recent bout of LBP responded well to steroid dose pack; he has RANDI planned end of 8/21 but feels like he may be able to avoid depending on response to PT.     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)    Living Status/Prior Level of Function: Independent with ADLs and IADLs, very active with yard/housework, daily running 4-5 miles when back feels ok    OBJECTIVE:       Standing Exam Normal Abnormal N/A Comments   Toe walk   +      Heel Walk +      Side bending  +  L sulcus doesn't deepen   Pelvic Height +      Fwd Bend- (aberrant juttering or innominate mvmt)  +  Juttering,hinges at hips only, L PSIS 1st    Extension  +  Very stiff, guarded; L sulcus doesn't deepen   Trendelenburg +      Kemps/Quadrant +      Stork +      SLS/SLS w rotation +                    Seated Exam Normal Abnormal N/A Comments   Pelvic Height +      Seated Rotation +      Seated flexion  +  L moves 1st   B hip IR +                    Supine Exam Normal Abnormal N/A Comments   Hip flexion +      Abduction +      ER +      IR +      JOAQUÍN/Tony  +  R SIJ pain, + toan's   Hip scour +      SLR +      Crossed SLR +      Supine to sit  +     SI distraction/compression +      Hip thrust +      Leg length  +  R short           Prone Exam Normal Abnormal N/A Comments   Prone knee bend +  R LE short-->long   Prone hip IR +      B Achilles reflex/Pheasant   +    PA/Spring +      Prone Instability test   + NV   Sacral Spring/thrust +                      ROM LEFT RIGHT Comments   Lumbar Flex 30     Lumbar Ext 10     Side Bend 15 13    Rotation WNL WNL                  ROM LEFT RIGHT Comments   Hip Flexion WNL WNL    Hip Abd WNL WNL    Hip ER WNL WNL    Hip IR WNL WNL    Hip Extension WNL WNL    Knee Ext WNL WNL    Knee Flex WNL WNL    Hamstring Flex Lacks 40 Lacks 40    Piriformis  + min                  Strength LEFT RIGHT Comments   Multifidus NV     Transverse Ab      Hip Flexors      Hip Abductors      Hip Extensors                     Myotomes Normal Abnormal Comments   Hip flexion (L1-L2) + all     Knee extension (L2-L4)      Dorsiflexion (L4-L5)      Great Toe Ext (L5)      Ankle Eversion (S1-S2)      Ankle PF(S1-S2)          Dermatomes Normal Abnormal Comments   inguinal area (L1)  + all     anterior mid-thigh (L2)      distal ant thigh/med knee (L3)      medial lower leg and foot (L4)      lateral lower leg and foot (L5)      posterior calf (S1)      medial calcaneus (S2)          Neural dynamic tension testing Normal Abnormal Comments   Slump Test  - Degree of knee flexion:  +     SLR  +     0-30 +     30-70 +     Femoral nerve (L2-4) +       Reflexes Normal Abnormal Comments   S1-2 Seated achilles +     S1-2 Prone knee bend      L3-4 Patellar tendon      C5-6 Biceps      C6 Brachioradialis      C7-8 Triceps      Clonus +     Babinski +     Mercer's +       Joint mobility:    []Normal    [x]Hypo-PAs L-spine, sacral nutation   []Hyper    Palpation: R SIJ, glute min, med, R QL, thoracolumbar PSs R>L    Functional Mobility/Transfers: loss of lumbopelvic motion with flex (hinges at hips only), ana to return to standing    Posture: slight L lat shift    Gait: (include devices/WB status) WNL    Bandages/Dressings/Incisions: NA    Orthopedic Special Tests: see above                       [x] Patient history, allergies, meds reviewed. Medical chart reviewed. See intake form. Review Of Systems (ROS):  [x]Performed Review of systems (Integumentary, CardioPulmonary, Neurological) by intake and observation. Intake form has been scanned into medical record. Patient has been instructed to contact their primary care physician regarding ROS issues if not already being addressed at this time. Co-morbidities/Complexities (which will affect course of rehabilitation):   []None           Arthritic conditions   []Rheumatoid arthritis (M05.9)  []Osteoarthritis (M19.91)   Cardiovascular conditions   []Hypertension (I10)  []Hyperlipidemia (E78.5)  []Angina pectoris (I20)  []Atherosclerosis (I70)   Musculoskeletal conditions   [x]Disc pathology   []Congenital spine pathologies   []Prior surgical intervention  []Osteoporosis (M81.8)  []Osteopenia (M85.8)   Endocrine conditions   []Hypothyroid (E03.9)  []Hyperthyroid Gastrointestinal conditions   []Constipation (S15.32)   Metabolic conditions   []Morbid obesity (E66.01)  []Diabetes type 1(E10.65) or 2 (E11.65)   []Neuropathy (G60.9)     Pulmonary conditions   []Asthma (J45)  []Coughing   []COPD (J44.9)   Psychological Disorders  []Anxiety (F41.9)  []Depression (F32.9)   []Other:   []Other:          Barriers to/and or personal factors that will affect rehab potential:              []Age  []Sex              []Motivation/Lack of Motivation                        [x]Co-Morbidities              []Cognitive Function, education/learning barriers              []Environmental, home barriers              []profession/work barriers  [x]past PT/medical experience  []other:  Justification: see above    Falls Risk Assessment (30 days):   [x] Falls Risk assessed and no intervention required.   [] Falls Risk assessed and Patient requires intervention due to being higher risk   TUG score (>12s at risk):     [] Falls education provided, including       G-Codes:       ASSESSMENT: Functional Impairments:     [x]Noted lumbar/proximal hip hypomobility   []Noted lumbosacral and/or generalized hypermobility   [x]Decreased Lumbosacral/hip/LE functional ROM   [x]Decreased core/proximal hip strength and neuromuscular control    []Decreased LE functional strength    []Abnormal reflexes/sensation/myotomal/dermatomal deficits  []Reduced balance/proprioceptive control    []other:      Functional Activity Limitations (from functional questionnaire and intake)   [x]Reduced ability to tolerate prolonged functional positions   [x]Reduced ability or difficulty with changes of positions or transfers between positions   [x]Reduced ability to maintain good posture and demonstrate good body mechanics with sitting, bending, and lifting   [x]Reduced ability to sleep   [x] Reduced ability or tolerance with driving and/or computer work   [x]Reduced ability to perform lifting, reaching, carrying tasks   [x]Reduced ability to squat   [x]Reduced ability to forward bend   [x]Reduced ability to ambulate prolonged functional periods/distances/surfaces   []Reduced ability to ascend/descend stairs   []other:       Participation Restrictions   [x]Reduced participation in self care activities   [x]Reduced participation in home management activities   []Reduced participation in work activities   [x]Reduced participation in social activities. [x]Reduced participation in sport/recreation activities. Classification:   []Signs/symptoms consistent with Lumbar instability/stabilization subgroup. [x]Signs/symptoms consistent with Lumbar mobilization/manipulation subgroup, myotomes and dermatomes intact. Meets manipulation criteria.     []Signs/symptoms consistent with Lumbar direction specific/centralization subgroup   []Signs/symptoms consistent with Lumbar traction subgroup     []Signs/symptoms consistent with lumbar facet dysfunction   []Signs/symptoms consistent with lumbar stenosis type dysfunction   []Signs/symptoms consistent with nerve root involvement including myotome & dermatome dysfunction   []Signs/symptoms consistent with post-surgical status including: decreased ROM, strength and function. []signs/symptoms consistent with pathology which may benefit from Dry needling     []other:      Prognosis/Rehab Potential:      []Excellent   [x]Good    []Fair   []Poor    Tolerance of evaluation/treatment:    []Excellent   [x]Good    []Fair   []Poor  Physical Therapy Evaluation Complexity Justification  [x] A history of present problem with:  [] no personal factors and/or comorbidities that impact the plan of care;  [x]1-2 personal factors and/or comorbidities that impact the plan of care  []3 personal factors and/or comorbidities that impact the plan of care  [x] An examination of body systems using standardized tests and measures addressing any of the following: body structures and functions (impairments), activity limitations, and/or participation restrictions;:  [] a total of 1-2 or more elements   [] a total of 3 or more elements   [x] a total of 4 or more elements   [x] A clinical presentation with:  [x] stable and/or uncomplicated characteristics   [] evolving clinical presentation with changing characteristics  [] unstable and unpredictable characteristics;   [x] Clinical decision making of [x] low, [] moderate, [] high complexity using standardized patient assessment instrument and/or measurable assessment of functional outcome.     [x] EVAL (LOW) 88642 (typically 20 minutes face-to-face)  [] EVAL (MOD) 66995 (typically 30 minutes face-to-face)  [] EVAL (HIGH) 69785 (typically 45 minutes face-to-face)  [] RE-EVAL         PLAN: Begin PT focusing on: proximal hip mobilizations, LB mobs, LB core activation, proximal hip activation, and HEP    Frequency/Duration:  1-2 days per week for 8-12 Weeks: estimated total of 10 PT visits  Interventions:  [x]  Therapeutic exercise including: strength training, ROM, for LE, Glutes and core   [x]  NMR activation and proprioception for glutes , LE and Core   [x]  Manual therapy as indicated for Hip complex, LE and spine to include: Dry Needling/IASTM, STM, PROM, Gr I-IV mobilizations, manipulation. [x]  Modalities as needed that may include: thermal agents, E-stim, Biofeedback, US, iontophoresis as indicated  [x]  Patient education on joint protection, postural re-education, activity modification, progression of HEP. HEP instruction: (see scanned forms)    GOALS:  Patient stated goal: Reduce pain, loosen back muscles, learn home exercises  [] Progressing: [] Met: [] Not Met: [] Adjusted    Therapist goals for Patient:   Short Term Goals: To be achieved in: 2 weeks  1. Independent in HEP and progression per patient tolerance, in order to prevent re-injury. [] Progressing: [] Met: [] Not Met: [] Adjusted  2. Patient will have a decrease in pain to facilitate improvement in movement, function, and ADLs as indicated by Functional Deficits. [] Progressing: [] Met: [] Not Met: [] Adjusted      Long Term Goals: To be achieved in: 12 weeks  1. Disability index score of 13% or less for the Modified Oswestry  to assist with reaching prior level of function. [] Progressing: [] Met: [] Not Met: [] Adjusted  2. Patient will demonstrate increased AROM to WNL, good LS mobility, good hip ROM to allow for proper joint functioning as indicated by patients Functional Deficits: forward bending, bridging, sit<>stand without discomfort in lumbo-sacral spine. [] Progressing: [] Met: [] Not Met: [] Adjusted  3. Patient will demonstrate an increase in Strength to good proximal hip and core activation to allow for proper functional mobility as indicated by patients Functional Deficits: squatting with proper body mechanics to lift 30# from table to floor height. [] Progressing: [] Met: [] Not Met: [] Adjusted  4.  Patient will return to sitting with proper [id number]-[id number] for work duties and functional activities without increased symptoms or restriction. [] Progressing: [] Met: [] Not Met: [] Adjusted  5. Pt will resume daily walk/run program for CV fitness, including flexibility and core strength program for maintenance.    [] Progressing: [] Met: [] Not Met: [] Adjusted     Electronically signed by:  Cheryl Cisneros, PT, DPT

## 2021-08-10 NOTE — FLOWSHEET NOTE
[facility],  [address]  Phone: [phone]  Fax [phone]    Physical Therapy Treatment Note/ Progress Report:           Date:  8/10/2021    Patient Name:  Dia Rebollar    :  1980  MRN: [id number]  Restrictions/Precautions:    Medical/Treatment Diagnosis Information:  Diagnosis: M54.40 lumbago with sciatica, M51.38 other thoracic, thoracolumbar and lumbosacral intervertebral disc degeneration  Treatment Diagnosis: M54.40 lumbago with sciatica, M51.38 other thoracic, thoracolumbar and lumbosacral intervertebral disc degeneration, M53.2X8 sacroiliac pain  Insurance/Certification information:  PT Insurance Information: Katlin Roberts 150  Physician Information:  Referring Practitioner: Bettie Chan  Has the plan of care been signed (Y/N):        []  Yes  [x]  No     Date of Patient follow up with Physician:       Is this a Progress Report:     []  Yes  [x]  No        If Yes:  Date Range for reporting period:  Beginning 8/10/21  Ending    Progress report will be due (10 Rx or 30 days whichever is less):        Recertification will be due (POC Duration  / 90 days whichever is less): 11/10/21        Visit # Insurance Allowable Auth Required   In-person 1 20-hard limit [x]  Yes-AIM []  No    Telehealth   []  Yes [x]  No    Total            Functional Scale: Modified Oswestry 26% disability    Date assessed:  8/10/21      Therapy Diagnosis/Practice Pattern:PatternF      Number of Comorbidities:  []0     [x]1-2    []3+    Latex Allergy:  [x]NO      []YES  Preferred Language for Healthcare:   [x]English       []other:      Pain level:  2-3/10    SUBJECTIVE:  See eval    OBJECTIVE: See eval   Observation:    Test measurements:      RESTRICTIONS/PRECAUTIONS: hx lumbar DDD    Exercises/Interventions:     Therapeutic Ex (58835) Sets/sec Reps Notes/CUES   Pt ed: eval findings, role of PT, pt's goals, sitting posture: lumbar Oscillations-Mobs:  G-I, II, III, IV (PA's, Inf., Post.)  [x] (39470) Provided manual therapy to mobilize proximal hip and LS spine soft tissue/joints for the purpose of modulating pain, promoting relaxation,  increasing ROM, reducing/eliminating soft tissue swelling/inflammation/restriction, improving soft tissue extensibility and allowing for proper ROM for normal function with self care, mobility, lifting and ambulation. Modalities:       Charges  Timed Code Treatment Minutes: 10   Total Treatment Minutes: 45 (eval)     Medicare total (add KX at $2000)         BWC time in/ time out:    TE time in /out    Manual time in/out    Neuro re ed time in/out    Untimed minutes        [x] EVAL (LOW) 85572   [] EVAL (MOD) 37009   [] EVAL (HIGH) 71865   [] RE-EVAL     [] ZU(47681) x     [] IONTO  [] NMR (07694) x     [] VASO  [x] Manual (06103) x 1     [] Other:  [] TA x      [] Mech Traction (50317)  [] ES(attended) (68687)      [] ES (un) (27728):     Goals: Patient stated goal: Reduce pain, loosen back muscles, learn home exercises  [] Progressing: [] Met: [] Not Met: [] Adjusted    Therapist goals for Patient:   Short Term Goals: To be achieved in: 2 weeks  1. Independent in HEP and progression per patient tolerance, in order to prevent re-injury. [] Progressing: [] Met: [] Not Met: [] Adjusted  2. Patient will have a decrease in pain to facilitate improvement in movement, function, and ADLs as indicated by Functional Deficits. [] Progressing: [] Met: [] Not Met: [] Adjusted      Long Term Goals: To be achieved in: 12 weeks  1. Disability index score of 13% or less for the Modified Oswestry  to assist with reaching prior level of function. [] Progressing: [] Met: [] Not Met: [] Adjusted  2.  Patient will demonstrate increased AROM to WNL, good LS mobility, good hip ROM to allow for proper joint functioning as indicated by patients Functional Deficits: forward bending, bridging, sit<>stand without discomfort in lumbo-sacral spine. [] Progressing: [] Met: [] Not Met: [] Adjusted  3. Patient will demonstrate an increase in Strength to good proximal hip and core activation to allow for proper functional mobility as indicated by patients Functional Deficits: squatting with proper body mechanics to lift 30# from table to floor height. [] Progressing: [] Met: [] Not Met: [] Adjusted  4. Patient will return to sitting with proper iibyull-tkvbe-kuycdmquodbchwh for work duties and functional activities without increased symptoms or restriction. [] Progressing: [] Met: [] Not Met: [] Adjusted  5. Pt will resume daily walk/run program for CV fitness, including flexibility and core strength program for maintenance. [] Progressing: [] Met: [] Not Met: [] Adjusted     Progression Towards Functional goals:  [] Patient is progressing as expected towards functional goals listed. [] Progression is slowed due to complexities listed. [] Progression has been slowed due to co-morbidities. [x] Plan just implemented, too soon to assess goals progression  [] Other:     Overall Progression Towards Functional goals/ Treatment Progress Update:  [] Patient is progressing as expected towards functional goals listed. [] Progression is slowed due to complexities/Impairments listed. [] Progression has been slowed due to co-morbidities.   [x] Plan just implemented, too soon to assess goals progression <30days   [] Goals require adjustment due to lack of progress  [] Patient is not progressing as expected and requires additional follow up with physician  [] Other    Prognosis for POC: [x] Good [] Fair  [] Poor      Patient requires continued skilled intervention: [x] Yes  [] No    Treatment/Activity Tolerance:  [x] Patient able to complete treatment  [] Patient limited by fatigue  [] Patient limited by pain    [] Patient limited by other medical complications  [] Other:     ASSESSMENT:     Return to Play: (if applicable)   []  Stage 1: Intro to Strength   []  Stage 2: Return to Run and Strength   []  Stage 3: Return to Jump and Strength   []  Stage 4: Dynamic Strength and Agility   []  Stage 5: Sport Specific Training     []  Ready to Return to Play, Meets All Above Stages   []  Not Ready for Return to Sports   Comments:                               PLAN: See eval  [] Continue per plan of care [] Alter current plan (see comments above)  [x] Plan of care initiated [] Hold pending MD visit [] Discharge      Electronically signed by:  Kimmy Kendall, PT , DPT  Note: If patient does not return for scheduled/ recommended follow up visits, this note will serve as a discharge from care along with most recent update on progress. Access Code: [email or web address]  URL: [email or web address]  Date: 08/10/2021  Prepared by: Kerline Tierney    Exercises  Cat-Camel - 5 x daily - 7 x weekly - 1 sets - 5 reps - 3 hold  Quadruped Rocking Backward - 5 x daily - 7 x weekly - 1 sets - 5 reps

## 2021-08-13 ENCOUNTER — HOSPITAL ENCOUNTER (OUTPATIENT)
Dept: PHYSICAL THERAPY | Age: 41
Setting detail: THERAPIES SERIES
Discharge: HOME | End: 2021-08-13
Payer: COMMERCIAL

## 2021-08-13 PROCEDURE — 97140 MANUAL THERAPY 1/> REGIONS: CPT

## 2021-08-13 PROCEDURE — 97110 THERAPEUTIC EXERCISES: CPT

## 2021-08-13 NOTE — FLOWSHEET NOTE
[facility], [address]  Phone: [phone]  Fax [phone]    Physical Therapy Treatment Note/ Progress Report:           Date:  2021    Patient Name:  Dia Rebollar    :  1980  MRN: [id number]  Restrictions/Precautions:    Medical/Treatment Diagnosis Information:  Diagnosis: M54.40 lumbago with sciatica, M51.38 other thoracic, thoracolumbar and lumbosacral intervertebral disc degeneration  Treatment Diagnosis: M54.40 lumbago with sciatica, M51.38 other thoracic, thoracolumbar and lumbosacral intervertebral disc degeneration, M53.2X8 sacroiliac pain  Insurance/Certification information:  PT Insurance Information: [employer]  Physician Information:  Referring Practitioner: Bettie Chan  Has the plan of care been signed (Y/N):        []  Yes  [x]  No     Date of Patient follow up with Physician:       Is this a Progress Report:     []  Yes  [x]  No        If Yes:  Date Range for reporting period:  Beginning 8/10/21  Ending    Progress report will be due (10 Rx or 30 days whichever is less):        Recertification will be due (POC Duration  / 90 days whichever is less): 11/10/21        Visit # Insurance Allowable Auth Required   In-person 2 20-hard limit [x]  Yes-AIM []  No    Telehealth   []  Yes [x]  No    Total            Functional Scale: Modified Oswestry 26% disability    Date assessed:  8/10/21      Therapy Diagnosis/Practice Pattern:PatternF      Number of Comorbidities:  []0     [x]1-2    []3+    Latex Allergy:  [x]NO      []YES  Preferred Language for Healthcare:   [x]English       []other:      Pain level:  2-3/10    SUBJECTIVE:  Pt was sore after IE, but mostly after prolonged standing past 2 days. He feels it's very hard to flex/ext his spine still.     OBJECTIVE:    Observation: no LLD, - IC in sitting/standing   Test measurements:      RESTRICTIONS/PRECAUTIONS: hx lumbar DDD    Exercises/Interventions:     Therapeutic Ex (98626) Sets/sec Reps Notes/CUES   Pt ed: Answered pt's questions re his s/s, pain originating structures, reasons for possible pain 5'     Quadruped cat/cow x10     Quadruped hip rock neutral then R, L hip ER bias x5 ea     Prone prop  Press up 2'  x10     Seated SB roll L-spine flex  R, L bias 10\"X5  10\"x3 ea     Seated HS stretch  Standing hip flexor S 30\"x2 R, L ea     NV sciatic N flossing                                                      Manual Intervention (00363) 25' total     Long-axis SIJ manip R, L   Corrected LLD, no pain with bride and sup<>sit   Supine lumbopelvic roll R, L x1 R, L  No cavitations   Prone PAs gr III-IV approx T11-L5 6'     SL'ing lumbar gapping slight ext bias gr III-IV L1-L5 R, L 6'     STM lumbar PSs,   MFR crosshand TL spine  Sacral base dist  12'           NMR re-education (75724)   CUES NEEDED                                                         Therapeutic Activity (06156)                                              Therapeutic Exercise and NMR EXR  [x] (25538) Provided verbal/tactile cueing for activities related to strengthening, flexibility, endurance, ROM  for improvements in proximal hip and core control with self care, mobility, lifting and ambulation.  [] (25693) Provided verbal/tactile cueing for activities related to improving balance, coordination, kinesthetic sense, posture, motor skill, proprioception  to assist with core control in self care, mobility, lifting, and ambulation.      Therapeutic Activities:    [] (72453 or 52257) Provided verbal/tactile cueing for activities related to improving balance, coordination, kinesthetic sense, posture, motor skill, proprioception and motor activation to allow for proper function  with self care and ADLs  [] (14663) Provided training and instruction to the patient for proper core and proximal hip recruitment and positioning with ambulation re-education     Home Exercise Program:    [x] (52478) Reviewed/Progressed HEP activities related to strengthening, flexibility, endurance, ROM of core, proximal hip and LE for functional self-care, mobility, lifting and ambulation   [] (49641) Reviewed/Progressed HEP activities related to improving balance, coordination, kinesthetic sense, posture, motor skill, proprioception of core, proximal hip and LE for self care, mobility, lifting, and ambulation      Manual Treatments:  PROM / STM / Oscillations-Mobs:  G-I, II, III, IV (PA's, Inf., Post.)  [x] (60253) Provided manual therapy to mobilize proximal hip and LS spine soft tissue/joints for the purpose of modulating pain, promoting relaxation,  increasing ROM, reducing/eliminating soft tissue swelling/inflammation/restriction, improving soft tissue extensibility and allowing for proper ROM for normal function with self care, mobility, lifting and ambulation. Modalities:   Declined ice    Charges  Timed Code Treatment Minutes: 45   Total Treatment Minutes: 45      Medicare total (add KX at $2000)         BWC time in/ time out:    TE time in /out    Manual time in/out    Neuro re ed time in/out    Untimed minutes        [] EVAL (LOW) 97805   [] EVAL (MOD) 45152   [] EVAL (HIGH) 73951   [] RE-EVAL     [x] SL(59844) x   1  [] IONTO  [] NMR (72671) x     [] VASO  [x] Manual (31471) x 2     [] Other:  [] TA x      [] Mech Traction (43422)  [] ES(attended) (62762)      [] ES (un) (08434):     Goals: Patient stated goal: Reduce pain, loosen back muscles, learn home exercises  [] Progressing: [] Met: [] Not Met: [] Adjusted    Therapist goals for Patient:   Short Term Goals: To be achieved in: 2 weeks  1. Independent in HEP and progression per patient tolerance, in order to prevent re-injury. [] Progressing: [] Met: [] Not Met: [] Adjusted  2. Patient will have a decrease in pain to facilitate improvement in movement, function, and ADLs as indicated by Functional Deficits.   [] Progressing: [] Met: [] Not Met: [] Adjusted      Long Term Goals: To be achieved in: 12 weeks  1. Disability index score of 13% or less for the Modified Oswestry  to assist with reaching prior level of function. [] Progressing: [] Met: [] Not Met: [] Adjusted  2. Patient will demonstrate increased AROM to WNL, good LS mobility, good hip ROM to allow for proper joint functioning as indicated by patients Functional Deficits: forward bending, bridging, sit<>stand without discomfort in lumbo-sacral spine. [] Progressing: [] Met: [] Not Met: [] Adjusted  3. Patient will demonstrate an increase in Strength to good proximal hip and core activation to allow for proper functional mobility as indicated by patients Functional Deficits: squatting with proper body mechanics to lift 30# from table to floor height. [] Progressing: [] Met: [] Not Met: [] Adjusted  4. Patient will return to sitting with proper plpqkrn-luotr-cotstdkuonickyn for work duties and functional activities without increased symptoms or restriction. [] Progressing: [] Met: [] Not Met: [] Adjusted  5. Pt will resume daily walk/run program for CV fitness, including flexibility and core strength program for maintenance. [] Progressing: [] Met: [] Not Met: [] Adjusted     Progression Towards Functional goals:  [] Patient is progressing as expected towards functional goals listed. [] Progression is slowed due to complexities listed. [] Progression has been slowed due to co-morbidities. [x] Plan just implemented, too soon to assess goals progression  [] Other:     Overall Progression Towards Functional goals/ Treatment Progress Update:  [] Patient is progressing as expected towards functional goals listed. [] Progression is slowed due to complexities/Impairments listed. [] Progression has been slowed due to co-morbidities.   [x] Plan just implemented, too soon to assess goals progression <30days   [] Goals require adjustment due to lack of progress  [] Patient is not progressing as expected and requires additional follow up with physician  [] Other    Prognosis for POC: [x] Good [] Fair  [] Poor      Patient requires continued skilled intervention: [x] Yes  [] No    Treatment/Activity Tolerance:  [x] Patient able to complete treatment  [] Patient limited by fatigue  [] Patient limited by pain    [] Patient limited by other medical complications  [] Other:     ASSESSMENT: Pt had improved lumbopelvic flex following tx and improved comfort with lumbar ext and noted inc ROM (not formally measured.) Added mobility exc for HEP. Return to Play: (if applicable)   []  Stage 1: Intro to Strength   []  Stage 2: Return to Run and Strength   []  Stage 3: Return to Jump and Strength   []  Stage 4: Dynamic Strength and Agility   []  Stage 5: Sport Specific Training     []  Ready to Return to Play, Meets All Above Stages   []  Not Ready for Return to Sports   Comments:                               PLAN:   [x] Continue per plan of care [] Alter current plan (see comments above)  [] Plan of care initiated [] Hold pending MD visit [] Discharge      Electronically signed by:  Campbell Allen, PT , DPT  Note: If patient does not return for scheduled/ recommended follow up visits, this note will serve as a discharge from care along with most recent update on progress. Access Code: [email or web address]  URL: [email or web address]  Date: 08/13/2021  Prepared by: Dano Cage    Exercises  Cat-Camel - 5 x daily - 7 x weekly - 1 sets - 5 reps - 3 hold  Quadruped Rocking Backward - 5 x daily - 7 x weekly - 1 sets - 5 reps  Sidelying Lumbar Rotation Stretch - 1 x daily - 7 x weekly - 1 sets - 2 reps - 60 hold  Child's Pose with Sidebending - 1 x daily - 7 x weekly - 1 sets - 2 reps - 30 hold  Prone Press Up on Elbows - 1 x daily - 7 x weekly - 1 sets - 10 reps - 60 hold  Prone Press Up - 1 x daily - 7 x weekly - 1 sets - 10 reps - 5 hold  Seated Flexion Stretch with Swiss Ball - 1 x daily - 7 x weekly - 1 sets - 2 reps - 15 hold  Seated Thoracic Flexion and Rotation with Swiss Ball - 1 x daily - 7 x weekly - 1 sets - 2 reps - 15 hold  Seated Hamstring Stretch - 2 x daily - 7 x weekly - 1 sets - 2 reps - 30 hold  Standing Hip Flexor Stretch - 2 x daily - 7 x weekly - 1 sets - 2 reps - 30 hold

## 2021-08-20 ENCOUNTER — HOSPITAL ENCOUNTER (OUTPATIENT)
Dept: PHYSICAL THERAPY | Age: 41
Setting detail: THERAPIES SERIES
Discharge: HOME | End: 2021-08-20
Payer: COMMERCIAL

## 2021-08-20 PROCEDURE — 97110 THERAPEUTIC EXERCISES: CPT

## 2021-08-20 PROCEDURE — 97140 MANUAL THERAPY 1/> REGIONS: CPT

## 2021-08-20 NOTE — FLOWSHEET NOTE
sitting/standing; hypomobile sacral mobility still   Test measurements:      RESTRICTIONS/PRECAUTIONS: hx lumbar DDD    Exercises/Interventions:     Therapeutic Ex (38356) Sets/sec Reps Notes/CUES   Pt ed: Answered pt's questions re his s/s, pain originating structures, reasons for possible pain      Quadruped cat/cow x10     Quadruped hip rock neutral then R, L hip ER bias x5 ea     Prone prop  Press up 2'  x10     Seated SB roll L-spine flex  R, L bias 10\"X5  10\"x3 ea     Seated HS stretch  Standing hip flexor S 30\"x2 R, L ea     NV sciatic N flossing                                                      Manual Intervention (40928) 25' total     Long-axis SIJ manip R, L   Corrected LLD, no pain with bride and sup<>sit   Supine lumbopelvic roll R, L   No cavitations   Prone PAs gr III-IV approx T11-L5 6'     SL'ing lumbar gapping slight ext bias gr III-IV L1-L5 R, L 6'       Sacral base mob nut  12'     Ant hip mobs gr III-IV prone  Hip IR/ER S's 15\"x6 R, L  15\"x2 ea R, L     TPR to R glutes, ball to piriformis 6'     NMR re-education (24563)   CUES NEEDED                                                         Therapeutic Activity (35356)                                              Therapeutic Exercise and NMR EXR  [x] (85590) Provided verbal/tactile cueing for activities related to strengthening, flexibility, endurance, ROM  for improvements in proximal hip and core control with self care, mobility, lifting and ambulation.  [] (68100) Provided verbal/tactile cueing for activities related to improving balance, coordination, kinesthetic sense, posture, motor skill, proprioception  to assist with core control in self care, mobility, lifting, and ambulation.      Therapeutic Activities:    [] (96655 or 02200) Provided verbal/tactile cueing for activities related to improving balance, coordination, kinesthetic sense, posture, motor skill, proprioception and motor activation to allow for proper function  with self care and ADLs  [] (35985) Provided training and instruction to the patient for proper core and proximal hip recruitment and positioning with ambulation re-education     Home Exercise Program:    [x] (64797) Reviewed/Progressed HEP activities related to strengthening, flexibility, endurance, ROM of core, proximal hip and LE for functional self-care, mobility, lifting and ambulation   [] (32590) Reviewed/Progressed HEP activities related to improving balance, coordination, kinesthetic sense, posture, motor skill, proprioception of core, proximal hip and LE for self care, mobility, lifting, and ambulation      Manual Treatments:  PROM / STM / Oscillations-Mobs:  G-I, II, III, IV (PA's, Inf., Post.)  [x] (97359) Provided manual therapy to mobilize proximal hip and LS spine soft tissue/joints for the purpose of modulating pain, promoting relaxation,  increasing ROM, reducing/eliminating soft tissue swelling/inflammation/restriction, improving soft tissue extensibility and allowing for proper ROM for normal function with self care, mobility, lifting and ambulation. Modalities:   Declined ice    Charges  Timed Code Treatment Minutes: 45   Total Treatment Minutes: 45      Medicare total (add KX at $2000)         BW time in/ time out:    TE time in /out    Manual time in/out    Neuro re ed time in/out    Untimed minutes        [] EVAL (LOW) 68206   [] EVAL (MOD) 72747   [] EVAL (HIGH) 08180   [] RE-EVAL     [x] ND(41709) x   1  [] IONTO  [] NMR (32019) x     [] VASO  [x] Manual (08176) x 2     [] Other:  [] TA x      [] Mech Traction (64231)  [] ES(attended) (03259)      [] ES (un) (49018):     Goals: Patient stated goal: Reduce pain, loosen back muscles, learn home exercises  [] Progressing: [] Met: [] Not Met: [] Adjusted    Therapist goals for Patient:   Short Term Goals: To be achieved in: 2 weeks  1. Independent in HEP and progression per patient tolerance, in order to prevent re-injury.    [] Progressing: [] Met: [] Not Met: [] Adjusted  2. Patient will have a decrease in pain to facilitate improvement in movement, function, and ADLs as indicated by Functional Deficits. [] Progressing: [] Met: [] Not Met: [] Adjusted      Long Term Goals: To be achieved in: 12 weeks  1. Disability index score of 13% or less for the Modified Oswestry  to assist with reaching prior level of function. [] Progressing: [] Met: [] Not Met: [] Adjusted  2. Patient will demonstrate increased AROM to WNL, good LS mobility, good hip ROM to allow for proper joint functioning as indicated by patients Functional Deficits: forward bending, bridging, sit<>stand without discomfort in lumbo-sacral spine. [] Progressing: [] Met: [] Not Met: [] Adjusted  3. Patient will demonstrate an increase in Strength to good proximal hip and core activation to allow for proper functional mobility as indicated by patients Functional Deficits: squatting with proper body mechanics to lift 30# from table to floor height. [] Progressing: [] Met: [] Not Met: [] Adjusted  4. Patient will return to sitting with proper vtxmliv-wdkgr-lxgxicmwhteeeln for work duties and functional activities without increased symptoms or restriction. [] Progressing: [] Met: [] Not Met: [] Adjusted  5. Pt will resume daily walk/run program for CV fitness, including flexibility and core strength program for maintenance. [] Progressing: [] Met: [] Not Met: [] Adjusted     Progression Towards Functional goals:  [x] Patient is progressing as expected towards functional goals listed. [] Progression is slowed due to complexities listed. [] Progression has been slowed due to co-morbidities. [] Plan just implemented, too soon to assess goals progression  [] Other:     Overall Progression Towards Functional goals/ Treatment Progress Update:  [x] Patient is progressing as expected towards functional goals listed. [] Progression is slowed due to complexities/Impairments listed.   [] Progression has been slowed due to co-morbidities. [] Plan just implemented, too soon to assess goals progression <30days   [] Goals require adjustment due to lack of progress  [] Patient is not progressing as expected and requires additional follow up with physician  [] Other    Prognosis for POC: [x] Good [] Fair  [] Poor      Patient requires continued skilled intervention: [x] Yes  [] No    Treatment/Activity Tolerance:  [x] Patient able to complete treatment  [] Patient limited by fatigue  [] Patient limited by pain    [] Patient limited by other medical complications  [] Other:     ASSESSMENT: Pt again has improved lumbopelvic flex and less pulling in R LB following tx. If mobility improves, can progress to core/glute strength NV. Return to Play: (if applicable)   []  Stage 1: Intro to Strength   []  Stage 2: Return to Run and Strength   []  Stage 3: Return to Jump and Strength   []  Stage 4: Dynamic Strength and Agility   []  Stage 5: Sport Specific Training     []  Ready to Return to Play, Meets All Above Stages   []  Not Ready for Return to Sports   Comments:                               PLAN:   [x] Continue per plan of care [] Alter current plan (see comments above)  [] Plan of care initiated [] Hold pending MD visit [] Discharge      Electronically signed by:  Luis Mcallister, PT , DPT  Note: If patient does not return for scheduled/ recommended follow up visits, this note will serve as a discharge from care along with most recent update on progress. Access Code: [email or web address]  URL: [email or web address]  Date: 08/13/2021  Prepared by: Fahad Villarreal    Exercises  Cat-Camel - 5 x daily - 7 x weekly - 1 sets - 5 reps - 3 hold  Quadruped Rocking Backward - 5 x daily - 7 x weekly - 1 sets - 5 reps  Sidelying Lumbar Rotation Stretch - 1 x daily - 7 x weekly - 1 sets - 2 reps - 60 hold  Child's Pose with Sidebending - 1 x daily - 7 x weekly - 1 sets - 2 reps - 30 hold  Prone Press Up on Elbows

## 2021-08-27 ENCOUNTER — HOSPITAL ENCOUNTER (OUTPATIENT)
Dept: PHYSICAL THERAPY | Age: 41
Setting detail: THERAPIES SERIES
Discharge: HOME | End: 2021-08-27
Payer: COMMERCIAL

## 2021-08-27 PROCEDURE — 97110 THERAPEUTIC EXERCISES: CPT

## 2021-08-27 PROCEDURE — 97140 MANUAL THERAPY 1/> REGIONS: CPT

## 2021-08-27 NOTE — FLOWSHEET NOTE
[facility], [address]  Phone: [phone]  Fax [phone]    Physical Therapy Treatment Note/ Progress Report:           Date:  2021    Patient Name:  Darryl Almazan    :  1980  MRN: [id number]  Restrictions/Precautions:    Medical/Treatment Diagnosis Information:  Diagnosis: M54.40 lumbago with sciatica, M51.38 other thoracic, thoracolumbar and lumbosacral intervertebral disc degeneration  Treatment Diagnosis: M54.40 lumbago with sciatica, M51.38 other thoracic, thoracolumbar and lumbosacral intervertebral disc degeneration, M53.2X8 sacroiliac pain  Insurance/Certification information:  PT Insurance Information: Katlin Roberts 150  Physician Information:  Referring Practitioner: Arlen Holter  Has the plan of care been signed (Y/N):        []  Yes  [x]  No     Date of Patient follow up with Physician:       Is this a Progress Report:     []  Yes  [x]  No        If Yes:  Date Range for reporting period:  Beginning 8/10/21  Ending    Progress report will be due (10 Rx or 30 days whichever is less): 08       Recertification will be due (POC Duration  / 90 days whichever is less): 11/10/21        Visit # Insurance Allowable Auth Required   In-person  approved through 10/8/21   (20-hard limit) [x]  Yes-AIM []  No    Telehealth   []  Yes [x]  No    Total            Functional Scale: Modified Oswestry 26% disability    Date assessed:  8/10/21      Therapy Diagnosis/Practice Pattern:PatternF      Number of Comorbidities:  []0     [x]1-2    []3+    Latex Allergy:  [x]NO      []YES  Preferred Language for Healthcare:   [x]English       []other:      Pain level:  2-310    SUBJECTIVE:  Pt feels stiffness still in R SIJ but improved L-spine pain/motion. He still gets sore at night sleeping up mm on either side of his spine.  Prolonged standing, then sitting for car ride then transition back to stand is getting less painful. OBJECTIVE:    Observation: no LLD, - IC in sitting/standing; hypomobile sacral mobility still   Test measurements:      RESTRICTIONS/PRECAUTIONS: hx lumbar DDD    Exercises/Interventions:     Therapeutic Ex (60544) Sets/sec Reps Notes/CUES   Pt ed:    Answered pt's questions re his s/s, pain originating structures, reasons for possible pain      Lumbar roll for sleep 2'  Showed how to make one   Quadruped cat/cow x10     Quadruped hip rock neutral then R, L hip ER bias x5 ea     Prone prop  Press up   x10     Seated SB roll L-spine flex  R, L bias      Seated HS stretch  Standing hip flexor S  QL S  TL rot in delaney-stretch 30\"x2 R, L ea    x5 R, L  In true stretch today   NV sciatic N flossing      FR horiz self-mob TLJ  Vertical FR S x5  2'     NV multif stab                                          Manual Intervention (67994) 20' total     Pelvic assess/reassess 4'     Long-axis SIJ manip R, L   Corrected LLD, no pain with bride and sup<>sit   Supine lumbopelvic roll R, L   No cavitations   Prone PAs gr III-IV approx T11-L5     SL'ing lumbar gapping slight ext bias gr III-IV L1-L5 R, L       Sacral base mob nut  unilat sacral base mob R, L      x5  x5 R, L ea     Ant hip mobs gr III-IV prone  Hip IR/ER S's 15\"x6 R, L  15\"x2 ea R, L           TPR to R glutes, ball to piriformis 6'     NMR re-education (09501)   CUES NEEDED                                                         Therapeutic Activity (35269)                                              Therapeutic Exercise and NMR EXR  [x] (24965) Provided verbal/tactile cueing for activities related to strengthening, flexibility, endurance, ROM  for improvements in proximal hip and core control with self care, mobility, lifting and ambulation.  [] (62896) Provided verbal/tactile cueing for activities related to improving balance, coordination, kinesthetic sense, posture, motor skill, proprioception  to assist with core control in self care, mobility, back muscles, learn home exercises  [] Progressing: [] Met: [] Not Met: [] Adjusted    Therapist goals for Patient:   Short Term Goals: To be achieved in: 2 weeks  1. Independent in HEP and progression per patient tolerance, in order to prevent re-injury. [] Progressing: [] Met: [] Not Met: [] Adjusted  2. Patient will have a decrease in pain to facilitate improvement in movement, function, and ADLs as indicated by Functional Deficits. [] Progressing: [] Met: [] Not Met: [] Adjusted      Long Term Goals: To be achieved in: 12 weeks  1. Disability index score of 13% or less for the Modified Oswestry  to assist with reaching prior level of function. [] Progressing: [] Met: [] Not Met: [] Adjusted  2. Patient will demonstrate increased AROM to WNL, good LS mobility, good hip ROM to allow for proper joint functioning as indicated by patients Functional Deficits: forward bending, bridging, sit<>stand without discomfort in lumbo-sacral spine. [] Progressing: [] Met: [] Not Met: [] Adjusted  3. Patient will demonstrate an increase in Strength to good proximal hip and core activation to allow for proper functional mobility as indicated by patients Functional Deficits: squatting with proper body mechanics to lift 30# from table to floor height. [] Progressing: [] Met: [] Not Met: [] Adjusted  4. Patient will return to sitting with proper qahopgj-wgosu-gxmknykfplqjzqe for work duties and functional activities without increased symptoms or restriction. [] Progressing: [] Met: [] Not Met: [] Adjusted  5. Pt will resume daily walk/run program for CV fitness, including flexibility and core strength program for maintenance. [] Progressing: [] Met: [] Not Met: [] Adjusted     Progression Towards Functional goals:  [x] Patient is progressing as expected towards functional goals listed. [] Progression is slowed due to complexities listed. [] Progression has been slowed due to co-morbidities.   [] Plan just implemented, too soon to assess goals progression  [] Other:     Overall Progression Towards Functional goals/ Treatment Progress Update:  [x] Patient is progressing as expected towards functional goals listed. [] Progression is slowed due to complexities/Impairments listed. [] Progression has been slowed due to co-morbidities. [] Plan just implemented, too soon to assess goals progression <30days   [] Goals require adjustment due to lack of progress  [] Patient is not progressing as expected and requires additional follow up with physician  [] Other    Prognosis for POC: [x] Good [] Fair  [] Poor      Patient requires continued skilled intervention: [x] Yes  [] No    Treatment/Activity Tolerance:  [x] Patient able to complete treatment  [] Patient limited by fatigue  [] Patient limited by pain    [] Patient limited by other medical complications  [] Other:     ASSESSMENT: Pt again has improved lumbopelvic mobility all directions, and no referred s/s. Progress to stability exc NV. Return to Play: (if applicable)   []  Stage 1: Intro to Strength   []  Stage 2: Return to Run and Strength   []  Stage 3: Return to Jump and Strength   []  Stage 4: Dynamic Strength and Agility   []  Stage 5: Sport Specific Training     []  Ready to Return to Play, Meets All Above Stages   []  Not Ready for Return to Sports   Comments:                               PLAN:   [x] Continue per plan of care [] Alter current plan (see comments above)  [] Plan of care initiated [] Hold pending MD visit [] Discharge      Electronically signed by:  Kat Medina, PT , DPT  Note: If patient does not return for scheduled/ recommended follow up visits, this note will serve as a discharge from care along with most recent update on progress. Access Code: [email or web address]  URL: [email or web address]  Date: 08/13/2021  Prepared by: Sonia Edward    Exercises  Cat-Camel - 5 x daily - 7 x weekly - 1 sets - 5 reps - 3 hold  Quadruped Rocking Backward - 5 x daily - 7 x weekly - 1 sets - 5 reps  Sidelying Lumbar Rotation Stretch - 1 x daily - 7 x weekly - 1 sets - 2 reps - 60 hold  Child's Pose with Sidebending - 1 x daily - 7 x weekly - 1 sets - 2 reps - 30 hold  Prone Press Up on Elbows - 1 x daily - 7 x weekly - 1 sets - 10 reps - 60 hold  Prone Press Up - 1 x daily - 7 x weekly - 1 sets - 10 reps - 5 hold  Seated Flexion Stretch with Swiss Ball - 1 x daily - 7 x weekly - 1 sets - 2 reps - 15 hold  Seated Thoracic Flexion and Rotation with Swiss Ball - 1 x daily - 7 x weekly - 1 sets - 2 reps - 15 hold  Seated Hamstring Stretch - 2 x daily - 7 x weekly - 1 sets - 2 reps - 30 hold  Standing Hip Flexor Stretch - 2 x daily - 7 x weekly - 1 sets - 2 reps - 30 hold

## 2021-08-31 ENCOUNTER — HOSPITAL ENCOUNTER (OUTPATIENT)
Dept: PHYSICAL THERAPY | Age: 41
Setting detail: THERAPIES SERIES
Discharge: HOME | End: 2021-08-31
Payer: COMMERCIAL

## 2021-08-31 NOTE — FLOWSHEET NOTE
[facility],  [address]        Physical Therapy  Cancellation/No-show Note  Patient Name:  Mira Ahumada  :  1980   Date:  2021  Cancelled visits to date: 1  No-shows to date: 0    For today's appointment patient:  [x]  Cancelled  []  Rescheduled appointment  []  No-show     Reason given by patient:  []  Patient ill  []  Conflicting appointment  []  No transportation    []  Conflict with work  []  No reason given  [x]  Other:     Comments: thought appt was Friday not Tues    Electronically signed by:  David Granados, PT, DPT

## 2021-09-03 ENCOUNTER — HOSPITAL ENCOUNTER (OUTPATIENT)
Dept: PHYSICAL THERAPY | Age: 41
Setting detail: THERAPIES SERIES
Discharge: HOME | End: 2021-09-03
Payer: COMMERCIAL

## 2021-09-03 PROCEDURE — 97110 THERAPEUTIC EXERCISES: CPT

## 2021-09-03 NOTE — FLOWSHEET NOTE
[facility], [address]  Phone: [phone]  Fax [phone]    Physical Therapy Treatment Note/ Progress Report:           Date:  9/3/2021    Patient Name:  Jose Antonio Montiel    :  1980  MRN: [id number]  Restrictions/Precautions:    Medical/Treatment Diagnosis Information:  Diagnosis: M54.40 lumbago with sciatica, M51.38 other thoracic, thoracolumbar and lumbosacral intervertebral disc degeneration  Treatment Diagnosis: M54.40 lumbago with sciatica, M51.38 other thoracic, thoracolumbar and lumbosacral intervertebral disc degeneration, M53.2X8 sacroiliac pain  Insurance/Certification information:  PT Insurance Information: Katlin Roberts 150  Physician Information:  Referring Practitioner: Nahed Welch  Has the plan of care been signed (Y/N):        []  Yes  [x]  No     Date of Patient follow up with Physician:       Is this a Progress Report:     []  Yes  [x]  No        If Yes:  Date Range for reporting period:  Beginning 8/10/21  Ending    Progress report will be due (10 Rx or 30 days whichever is less):        Recertification will be due (POC Duration  / 90 days whichever is less): 11/10/21        Visit # Insurance Allowable Auth Required   In-person  approved through 10/8/21   (20-hard limit) [x]  Yes-AIM []  No    Telehealth   []  Yes [x]  No    Total            Functional Scale: Modified Oswestry 26% disability    Date assessed:  8/10/21      Therapy Diagnosis/Practice Pattern:PatternF      Number of Comorbidities:  []0     [x]1-2    []3+    Latex Allergy:  [x]NO      []YES  Preferred Language for Healthcare:   [x]English       []other:      Pain level:  2-3/10    SUBJECTIVE:  Pt feels back is ~80-85% better. He still gets some stiffness with transitional mvmts but than stretching/AROM makes it better.     OBJECTIVE:    Observation: no LLD, - IC in sitting/standing; hypomobile sacral mobility still   Test measurements:      RESTRICTIONS/PRECAUTIONS: hx lumbar DDD    Exercises/Interventions:     Therapeutic Ex (70262) Sets/sec Reps Notes/CUES   Pt ed:    Answered pt's questions re his s/s, pain originating structures, reasons for possible pain      Lumbar roll for sleep  Showed how to make one   Quadruped cat/cow     Quadruped hip rock neutral then R, L hip ER bias     Prone prop  Press up     Seated SB roll L-spine flex  R, L bias     Seated HS stretch  Standing hip flexor S  QL S  TL rot in delaney-stretch  In true stretch today   NV sciatic N flossing     FR horiz self-mob TLJ  Vertical FR S      multif stab:  Quadruped hip lift on 1/2 FR     x15 R, L     Quadruped bird dog 10\" holds x2' alt     palloff press  \" \" with B UE flex/ext arc x20 R, L   x10 R, L  Green ttubes   \" \"    TrA SLR  March   Alt bike from table-top X10,R, L ea     Bridge with march  Bridge with CL LE lift 5\"x10 R, L ea     Plank  Side plank 60\"  15\"x2 R, L           Manual Intervention (59582)     Pelvic assess/reassess     Long-axis SIJ manip R, L  Corrected LLD, no pain with bride and sup<>sit   Supine lumbopelvic roll R, L  No cavitations   Prone PAs gr III-IV approx T11-L5     SL'ing lumbar gapping slight ext bias gr III-IV L1-L5 R, L       Sacral base mob nut  unilat sacral base mob R, L      Ant hip mobs gr III-IV prone  Hip IR/ER S's          TPR to R glutes, ball to piriformis     NMR re-education (22215)   CUES NEEDED                                                         Therapeutic Activity (37518)                                              Therapeutic Exercise and NMR EXR  [x] (02461) Provided verbal/tactile cueing for activities related to strengthening, flexibility, endurance, ROM  for improvements in proximal hip and core control with self care, mobility, lifting and ambulation.  [] (37514) Provided verbal/tactile cueing for activities related to improving balance, coordination, kinesthetic sense, posture, motor skill, proprioception  to assist with core control in self care, mobility, lifting, and ambulation. Therapeutic Activities:    [] (38300 or 55239) Provided verbal/tactile cueing for activities related to improving balance, coordination, kinesthetic sense, posture, motor skill, proprioception and motor activation to allow for proper function  with self care and ADLs  [] (44499) Provided training and instruction to the patient for proper core and proximal hip recruitment and positioning with ambulation re-education     Home Exercise Program:    [x] (21269) Reviewed/Progressed HEP activities related to strengthening, flexibility, endurance, ROM of core, proximal hip and LE for functional self-care, mobility, lifting and ambulation   [] (13759) Reviewed/Progressed HEP activities related to improving balance, coordination, kinesthetic sense, posture, motor skill, proprioception of core, proximal hip and LE for self care, mobility, lifting, and ambulation      Manual Treatments:  PROM / STM / Oscillations-Mobs:  G-I, II, III, IV (PA's, Inf., Post.)  [x] (50051) Provided manual therapy to mobilize proximal hip and LS spine soft tissue/joints for the purpose of modulating pain, promoting relaxation,  increasing ROM, reducing/eliminating soft tissue swelling/inflammation/restriction, improving soft tissue extensibility and allowing for proper ROM for normal function with self care, mobility, lifting and ambulation.      Modalities:   Declined ice    Charges  Timed Code Treatment Minutes: 45   Total Treatment Minutes: 45      Medicare total (add KX at $2000)         BWC time in/ time out:    TE time in /out    Manual time in/out    Neuro re ed time in/out    Untimed minutes        [] EVAL (LOW) 23467   [] EVAL (MOD) 04593   [] EVAL (HIGH) 39027   [] RE-EVAL     [x] RJ(34528) x   3  [] IONTO  [] NMR (89658) x     [] VASO  [] Manual (84685) x      [] Other:  [] TA x      [] Mech Traction (49382)  [] ES(attended) (59145)      [] ES (un) (66468):     Goals: Patient stated goal: Reduce pain, loosen back muscles, learn home exercises  [] Progressing: [] Met: [] Not Met: [] Adjusted    Therapist goals for Patient:   Short Term Goals: To be achieved in: 2 weeks  1. Independent in HEP and progression per patient tolerance, in order to prevent re-injury. [] Progressing: [] Met: [] Not Met: [] Adjusted  2. Patient will have a decrease in pain to facilitate improvement in movement, function, and ADLs as indicated by Functional Deficits. [] Progressing: [] Met: [] Not Met: [] Adjusted      Long Term Goals: To be achieved in: 12 weeks  1. Disability index score of 13% or less for the Modified Oswestry  to assist with reaching prior level of function. [] Progressing: [] Met: [] Not Met: [] Adjusted  2. Patient will demonstrate increased AROM to WNL, good LS mobility, good hip ROM to allow for proper joint functioning as indicated by patients Functional Deficits: forward bending, bridging, sit<>stand without discomfort in lumbo-sacral spine. [] Progressing: [] Met: [] Not Met: [] Adjusted  3. Patient will demonstrate an increase in Strength to good proximal hip and core activation to allow for proper functional mobility as indicated by patients Functional Deficits: squatting with proper body mechanics to lift 30# from table to floor height. [] Progressing: [] Met: [] Not Met: [] Adjusted  4. Patient will return to sitting with proper obystbd-slumb-dbefmdeafirpnff for work duties and functional activities without increased symptoms or restriction. [] Progressing: [] Met: [] Not Met: [] Adjusted  5. Pt will resume daily walk/run program for CV fitness, including flexibility and core strength program for maintenance. [] Progressing: [] Met: [] Not Met: [] Adjusted     Progression Towards Functional goals:  [x] Patient is progressing as expected towards functional goals listed. [] Progression is slowed due to complexities listed.   [] Progression has been slowed due to co-morbidities. [] Plan just implemented, too soon to assess goals progression  [] Other:     Overall Progression Towards Functional goals/ Treatment Progress Update:  [x] Patient is progressing as expected towards functional goals listed. [] Progression is slowed due to complexities/Impairments listed. [] Progression has been slowed due to co-morbidities. [] Plan just implemented, too soon to assess goals progression <30days   [] Goals require adjustment due to lack of progress  [] Patient is not progressing as expected and requires additional follow up with physician  [] Other    Prognosis for POC: [x] Good [] Fair  [] Poor      Patient requires continued skilled intervention: [x] Yes  [] No    Treatment/Activity Tolerance:  [x] Patient able to complete treatment  [] Patient limited by fatigue  [] Patient limited by pain    [] Patient limited by other medical complications  [] Other:     ASSESSMENT: Pt challenged with core and glute stab this date, fatigues quickly in quadruped. No inc'd LBP with there-ex except initial SLR that resolved with inc'd TrA stab. Return to Play: (if applicable)   []  Stage 1: Intro to Strength   []  Stage 2: Return to Run and Strength   []  Stage 3: Return to Jump and Strength   []  Stage 4: Dynamic Strength and Agility   []  Stage 5: Sport Specific Training     []  Ready to Return to Play, Meets All Above Stages   []  Not Ready for Return to Sports   Comments:                               PLAN:   [x] Continue per plan of care [] Alter current plan (see comments above)  [] Plan of care initiated [] Hold pending MD visit [] Discharge      Electronically signed by:  Marce Mejía, PT , DPT  Note: If patient does not return for scheduled/ recommended follow up visits, this note will serve as a discharge from care along with most recent update on progress. Access Code: [email or web address]  URL: [email or web address]  Date: 09/03/2021  Prepared by: Gregorio Giordano    Exercises  Cat-Camel - 5 x daily - 7 x weekly - 1 sets - 5 reps - 3 hold  Quadruped Rocking Backward - 5 x daily - 7 x weekly - 1 sets - 5 reps  Sidelying Lumbar Rotation Stretch - 1 x daily - 7 x weekly - 1 sets - 2 reps - 60 hold  Child's Pose with Sidebending - 1 x daily - 7 x weekly - 1 sets - 2 reps - 30 hold  Prone Press Up on Elbows - 1 x daily - 7 x weekly - 1 sets - 10 reps - 60 hold  Prone Press Up - 1 x daily - 7 x weekly - 1 sets - 10 reps - 5 hold  Seated Flexion Stretch with Swiss Ball - 1 x daily - 7 x weekly - 1 sets - 2 reps - 15 hold  Seated Thoracic Flexion and Rotation with Swiss Ball - 1 x daily - 7 x weekly - 1 sets - 2 reps - 15 hold  Seated Hamstring Stretch - 2 x daily - 7 x weekly - 1 sets - 2 reps - 30 hold  Standing Hip Flexor Stretch - 2 x daily - 7 x weekly - 1 sets - 2 reps - 30 hold  Standard Plank - 1 x daily - 3 x weekly - 2 sets - 60 hold  Side Plank on Elbow - 1 x daily - 3 x weekly - 2 sets - 10 reps - 20-30 hold  Bird Dog - 1 x daily - 3 x weekly - 10 reps - 10 hold  Standing Anti-Rotation Press with Anchored Resistance - 1 x daily - 3 x weekly - 1 sets - 20 reps  Supine Transversus Abdominis Bracing with Leg Extension - 1 x daily - 3 x weekly - 1 sets - 20 reps  Marching Bridge - 1 x daily - 3 x weekly - 1 sets - 10 reps - 5 hold  Supine Bridge with Leg Extension - 1 x daily - 3 x weekly - 1 sets - 10 reps - 5 hold

## 2021-09-24 ENCOUNTER — HOSPITAL ENCOUNTER (OUTPATIENT)
Dept: PHYSICAL THERAPY | Age: 41
Setting detail: THERAPIES SERIES
Discharge: HOME | End: 2021-09-24
Payer: COMMERCIAL

## 2021-09-24 PROCEDURE — 97140 MANUAL THERAPY 1/> REGIONS: CPT

## 2021-09-24 PROCEDURE — 97110 THERAPEUTIC EXERCISES: CPT

## 2021-09-24 NOTE — DISCHARGE SUMMARY
[facility] [address] [place]  Phone: [phone]  Fax [phone]    Physical Therapy Treatment Note/ Progress Report:           Date:  2021    Patient Name:  Tisha Chapin    :  1980  MRN: [id number]  Restrictions/Precautions:    Medical/Treatment Diagnosis Information:  Diagnosis: M54.40 lumbago with sciatica, M51.38 other thoracic, thoracolumbar and lumbosacral intervertebral disc degeneration  Treatment Diagnosis: M54.40 lumbago with sciatica, M51.38 other thoracic, thoracolumbar and lumbosacral intervertebral disc degeneration, M53.2X8 sacroiliac pain  Insurance/Certification information:  PT Insurance Information: [facility]  Physician Information:  Referring Practitioner: Gardenia Schmidt  Has the plan of care been signed (Y/N):        []  Yes  [x]  No     Date of Patient follow up with Physician:       Is this a Progress Report:     []  Yes  [x]  No        If Yes:  Date Range for reporting period:  Beginning 8/10/21  Ending 21    Progress report will be due (10 Rx or 30 days whichever is less):        Recertification will be due (POC Duration  / 90 days whichever is less): 11/10/21        Visit # Insurance Allowable Auth Required   In-person -DC 7 approved through 10/8/21   (20-hard limit) [x]  Yes-AIM []  No    Telehealth   []  Yes [x]  No    Total            Functional Scale: Modified Oswestry 26% disability    Date assessed:  8/10/21  Functional Scale: Modified Oswestry 26% disability    Date assessed:  12      Therapy Diagnosis/Practice Pattern:PatternF      Number of Comorbidities:  []0     [x]1-2    []3+    Latex Allergy:  [x]NO      []YES  Preferred Language for Healthcare:   [x]English       []other:      Pain level:  2-3/10    SUBJECTIVE:  Pt feels back is ~90% better. He still feels tightness in R low back, and some stiffness if he sits too long.  He can lift ~45-50# for household chores/yardwork, and has resumed walking ~1.5 miles most days. He still hasn't run, but has jogged a bit and felt ok. He's resumed workouts with weights at the [facility]. OBJECTIVE:    Observation: no LLD, - IC in sitting/standing; tightness R QL   Test measurements:      RESTRICTIONS/PRECAUTIONS: hx lumbar DDD    Exercises/Interventions:     Therapeutic Ex (91436) Sets/sec Reps Notes/CUES   Pt ed: Answered pt's questions re his s/s, what to do if he worsens again, maintenance of flexibility/strength and spine mobility. Verb review core stab 5'     Lumbar roll for sleep  Showed how to make one   Quadruped cat/cow     Quadruped hip rock neutral then R, L hip ER bias     Prone prop  Press up     Seated SB roll L-spine flex  R, L bias     Seated HS stretch  Standing hip flexor S  QL S  TL rot in delaney-stretch  In true stretch today   NV sciatic N flossing     FR horiz self-mob TLJ  Vertical FR S      multif stab:  Quadruped hip lift on 1/2 FR       Quadruped bird dog     palloff press  \" \" with B UE flex/ext arc  Green ttubes   \" \"    TrA SLR  March   Alt bike from [employer] with march  Bridge with CL LE lift     Plank  Side plank 60\"  15\"x2 R, L  Verb review   Seated QL S vs sidelying over bolster.  Also reviewed standing and quadruped stretching 30\"x2 ea     Manual Intervention (80394) 20' total     Pelvic assess/reassess 4'  ok   STM R>L lumbar PSs, R QL, iliacus  R QL S in Sl'ing, lumbar gapping R side up gr III T12-L5  PAs T12-L5 gr III 15'     Long-axis SIJ manip R, L  Corrected LLD, no pain with bride and sup<>sit   Supine lumbopelvic roll R, L  No cavitations   Prone PAs gr III-IV approx T11-L5     SL'ing lumbar gapping slight ext bias gr III-IV L1-L5 R, L       Sacral base mob nut  unilat sacral base mob R, L      Ant hip mobs gr III-IV prone  Hip IR/ER S's          TPR to R glutes, ball to piriformis     NMR re-education (01355)   CUES NEEDED Treatment Minutes: 38   Total Treatment Minutes: 38      Medicare total (add KX at $2000)         BWC time in/ time out:    TE time in /out    Manual time in/out    Neuro re ed time in/out    Untimed minutes        [] EVAL (LOW) 52677   [] EVAL (MOD) 60558   [] EVAL (HIGH) 18844   [] RE-EVAL     [x] UE(44230) x   2  [] IONTO  [] NMR (63239) x     [] VASO  [x] Manual (35360) x1      [] Other:  [] TA x      [] Mech Traction (24366)  [] ES(attended) (13726)      [] ES (un) (27918):     Goals: Patient stated goal: Reduce pain, loosen back muscles, learn home exercises  [] Progressing: [x] Met: [] Not Met: [] Adjusted    Therapist goals for Patient:   Short Term Goals: To be achieved in: 2 weeks  1. Independent in HEP and progression per patient tolerance, in order to prevent re-injury. [] Progressing: [x] Met: [] Not Met: [] Adjusted  2. Patient will have a decrease in pain to facilitate improvement in movement, function, and ADLs as indicated by Functional Deficits. [] Progressing: [x] Met: [] Not Met: [] Adjusted      Long Term Goals: To be achieved in: 12 weeks  1. Disability index score of 13% or less for the Modified Oswestry  to assist with reaching prior level of function. [] Progressing: [x] Met: [] Not Met: [] Adjusted  2. Patient will demonstrate increased AROM to WNL, good LS mobility, good hip ROM to allow for proper joint functioning as indicated by patients Functional Deficits: forward bending, bridging, sit<>stand without discomfort in lumbo-sacral spine. [] Progressing: [x] Met: [] Not Met: [] Adjusted  3. Patient will demonstrate an increase in Strength to good proximal hip and core activation to allow for proper functional mobility as indicated by patients Functional Deficits: squatting with proper body mechanics to lift 30# from table to floor height. [] Progressing: [x] Met: [] Not Met: [] Adjusted  4.  Patient will return to sitting with proper [id number] for work duties PLAN:   [] Continue per plan of care [] Alter current plan (see comments above)  [] Plan of care initiated [] Hold pending MD visit [x] Discharge      Electronically signed by:  Ashleigh Huber, PT , DPT  Note: If patient does not return for scheduled/ recommended follow up visits, this note will serve as a discharge from care along with most recent update on progress. Access Code: [email or web address]  URL: [employer]/  Date: 09/03/2021  Prepared by: Monty Mcnallyn    Exercises  Cat-Camel - 5 x daily - 7 x weekly - 1 sets - 5 reps - 3 hold  Quadruped Rocking Backward - 5 x daily - 7 x weekly - 1 sets - 5 reps  Sidelying Lumbar Rotation Stretch - 1 x daily - 7 x weekly - 1 sets - 2 reps - 60 hold  Child's Pose with Sidebending - 1 x daily - 7 x weekly - 1 sets - 2 reps - 30 hold  Prone Press Up on Elbows - 1 x daily - 7 x weekly - 1 sets - 10 reps - 60 hold  Prone Press Up - 1 x daily - 7 x weekly - 1 sets - 10 reps - 5 hold  Seated Flexion Stretch with Swiss Ball - 1 x daily - 7 x weekly - 1 sets - 2 reps - 15 hold  Seated Thoracic Flexion and Rotation with Swiss Ball - 1 x daily - 7 x weekly - 1 sets - 2 reps - 15 hold  Seated Hamstring Stretch - 2 x daily - 7 x weekly - 1 sets - 2 reps - 30 hold  Standing Hip Flexor Stretch - 2 x daily - 7 x weekly - 1 sets - 2 reps - 30 hold  Standard Plank - 1 x daily - 3 x weekly - 2 sets - 60 hold  Side Plank on Elbow - 1 x daily - 3 x weekly - 2 sets - 10 reps - 20-30 hold  Bird Dog - 1 x daily - 3 x weekly - 10 reps - 10 hold  Standing Anti-Rotation Press with Anchored Resistance - 1 x daily - 3 x weekly - 1 sets - 20 reps  Supine Transversus Abdominis Bracing with Leg Extension - 1 x daily - 3 x weekly - 1 sets - 20 reps  Marching Bridge - 1 x daily - 3 x weekly - 1 sets - 10 reps - 5 hold  Supine Bridge with Leg Extension - 1 x daily - 3 x weekly - 1 sets - 10 reps - 5 hold